# Patient Record
Sex: FEMALE | Race: BLACK OR AFRICAN AMERICAN | NOT HISPANIC OR LATINO | Employment: FULL TIME | URBAN - METROPOLITAN AREA
[De-identification: names, ages, dates, MRNs, and addresses within clinical notes are randomized per-mention and may not be internally consistent; named-entity substitution may affect disease eponyms.]

---

## 2017-01-04 ENCOUNTER — HISTORICAL (OUTPATIENT)
Dept: LAB | Facility: HOSPITAL | Age: 37
End: 2017-01-04

## 2018-07-23 DIAGNOSIS — Z36.89 ENCOUNTER FOR FETAL ANATOMIC SURVEY: ICD-10-CM

## 2018-07-24 DIAGNOSIS — I71.20 THORACIC AORTIC ANEURYSM WITHOUT RUPTURE: Primary | ICD-10-CM

## 2018-07-24 DIAGNOSIS — I71.21 ASCENDING AORTIC ANEURYSM: Primary | ICD-10-CM

## 2018-07-26 ENCOUNTER — ANESTHESIA (OUTPATIENT)
Dept: ANESTHESIOLOGY | Facility: OTHER | Age: 38
End: 2018-07-26

## 2018-07-26 ENCOUNTER — OFFICE VISIT (OUTPATIENT)
Dept: MATERNAL FETAL MEDICINE | Facility: CLINIC | Age: 38
End: 2018-07-26
Payer: MEDICAID

## 2018-07-26 ENCOUNTER — ANESTHESIA EVENT (OUTPATIENT)
Dept: ANESTHESIOLOGY | Facility: OTHER | Age: 38
End: 2018-07-26

## 2018-07-26 ENCOUNTER — HOSPITAL ENCOUNTER (EMERGENCY)
Facility: OTHER | Age: 38
Discharge: HOME | End: 2018-07-26
Attending: OBSTETRICS & GYNECOLOGY
Payer: MEDICAID

## 2018-07-26 VITALS
SYSTOLIC BLOOD PRESSURE: 124 MMHG | SYSTOLIC BLOOD PRESSURE: 116 MMHG | WEIGHT: 141.56 LBS | RESPIRATION RATE: 18 BRPM | DIASTOLIC BLOOD PRESSURE: 70 MMHG | DIASTOLIC BLOOD PRESSURE: 76 MMHG | TEMPERATURE: 98 F | HEART RATE: 90 BPM

## 2018-07-26 DIAGNOSIS — Z3A.35 35 WEEKS GESTATION OF PREGNANCY: Primary | ICD-10-CM

## 2018-07-26 DIAGNOSIS — I71.21 ASCENDING AORTIC ANEURYSM: ICD-10-CM

## 2018-07-26 DIAGNOSIS — Z36.89 ENCOUNTER FOR FETAL ANATOMIC SURVEY: ICD-10-CM

## 2018-07-26 DIAGNOSIS — O47.9 BRAXTON HICK'S CONTRACTION: ICD-10-CM

## 2018-07-26 DIAGNOSIS — I71.20 THORACIC AORTIC ANEURYSM WITHOUT RUPTURE: ICD-10-CM

## 2018-07-26 PROCEDURE — 99212 OFFICE O/P EST SF 10 MIN: CPT | Mod: PBBFAC | Performed by: OBSTETRICS & GYNECOLOGY

## 2018-07-26 PROCEDURE — 76819 FETAL BIOPHYS PROFIL W/O NST: CPT | Mod: 26,S$PBB,, | Performed by: OBSTETRICS & GYNECOLOGY

## 2018-07-26 PROCEDURE — 99284 EMERGENCY DEPT VISIT MOD MDM: CPT | Mod: 25,27

## 2018-07-26 PROCEDURE — 99284 EMERGENCY DEPT VISIT MOD MDM: CPT | Mod: 25,,, | Performed by: OBSTETRICS & GYNECOLOGY

## 2018-07-26 PROCEDURE — 99203 OFFICE O/P NEW LOW 30 MIN: CPT | Mod: 25,S$PBB,, | Performed by: OBSTETRICS & GYNECOLOGY

## 2018-07-26 PROCEDURE — 59025 FETAL NON-STRESS TEST: CPT | Mod: 59

## 2018-07-26 PROCEDURE — 76819 FETAL BIOPHYS PROFIL W/O NST: CPT | Mod: PBBFAC | Performed by: OBSTETRICS & GYNECOLOGY

## 2018-07-26 PROCEDURE — 99999 PR PBB SHADOW E&M-EST. PATIENT-LVL II: CPT | Mod: PBBFAC,,, | Performed by: OBSTETRICS & GYNECOLOGY

## 2018-07-26 PROCEDURE — 76811 OB US DETAILED SNGL FETUS: CPT | Mod: 26,S$PBB,, | Performed by: OBSTETRICS & GYNECOLOGY

## 2018-07-26 PROCEDURE — 76811 OB US DETAILED SNGL FETUS: CPT | Mod: PBBFAC | Performed by: OBSTETRICS & GYNECOLOGY

## 2018-07-26 PROCEDURE — 59025 FETAL NON-STRESS TEST: CPT | Mod: 26,,, | Performed by: OBSTETRICS & GYNECOLOGY

## 2018-07-26 RX ORDER — NAPROXEN SODIUM 220 MG/1
81 TABLET, FILM COATED ORAL DAILY
COMMUNITY

## 2018-07-26 NOTE — PROGRESS NOTES
Bhavna with L&D notified that pt will come up to r/o labor per Dr Saeed. Verbalized understanding of information. Dr Duggan with anesthesia will see pt in OB ED.

## 2018-07-26 NOTE — DISCHARGE INSTRUCTIONS
Report to L&D or call if complaints of decreased fetal movement, loss of fluid, vaginal bleeding, or contractions (every 5 minutes for 2 straight hours).  Increase water intake to 8-10 bottles of water per day.  Continue kick counts.  Keep all existing appointments.       Kick Counts    Its normal to worry about your babys health. One way you can know your babys doing well is to record the babys movements once a day. This is called a kick count. Remember to take your kick count records to all your appointments with your healthcare provider.  How to count kicks  Here are tips for counting kicks:  · Choose a time when the baby is active, such as after a meal.   · Sit comfortably or lie on your side.   · The first time the baby moves, write down the time.   · Count each movement until the baby has moved 10 times. This can take from 20 minutes to 2 hours.   · Try to do it at the same time each day.  When to call your healthcare provider  Call your healthcare provider right away if you notice any of the following:  · Your baby moves fewer than 10 times in 2 hours while youre doing kick counts.  · Your baby moves much less often than on the days before.  · You have not felt your baby move all day.  Date Last Reviewed: 2015  © 4668-3300 [employer]. [address] [place] [address], [place] [address]. All rights reserved. This information is not intended as a substitute for professional medical care. Always follow your healthcare professional's instructions.        Premature Labor    Premature labor ( labor) is when symptoms of labor occur before 37 weeks of pregnancy. (This is 3 weeks before your due date.) Premature labor can lead to premature delivery. This means giving birth to your baby early. Babies need at least 37 weeks of pregnancy for all the organs to develop normally. The earlier the delivery, the greater the risks to the baby.  In most cases, the cause of premature labor is unknown. But  certain factors may make the problem more likely. These include:  · History of premature labor with other pregnancies  · Smoking  · Alcohol or substance abuse  · Low pre-pregnancy weight or weight gain during pregnancy  · Short time period between pregnancies  · Being pregnant with twins, triplets, or more  · History of certain types of surgery on the cervix or uterus  · Having a short cervix  · Certain infections  There are a number of other risk factors. Ask your healthcare provider to help you understand the risk factors specific to your case. Then find out what you can do to control or reduce them.  Contractions are one of the main signs of premature labor. A contraction is different from cramping. It may feel painful and the belly (abdomen) may get hard. It can last from a few seconds to a few minutes. Some women may feel only a sense of pressure in the belly, thighs, rectum, or vagina. Some may feel only the hardening of the uterus without pain or pressure. Or there may be a constant pain the lower back, which spreads forward toward the belly.    Premature labor can often be treated with medicines. A hospital stay will likely be needed. If labor is stopped successfully and you and your baby are both healthy, you may be discharged to continue care at home.  Home care  · Ask your provider any questions you have. Be certain you understand how to care for yourself at home. Also follow all recommendations given by your healthcare providers.  · Learn the signs of premature labor. Watch for these signs when you get home.  · Limit or restrict activities as advised. This may include stopping certain physical activities and cutting back hours at work.  · Avoid doing any strenuous work. Ask family and friends for help with tasks and support at home, if needed.  · Dont smoke, drink alcohol, or use other harmful substances.  · Take steps to reduce stress.  · Report any unusual symptoms to your provider.  Follow-up  care  Follow up with your healthcare provider, or as directed. Weekly visits with your provider may be needed.  When to seek medical advice  Call your healthcare provider right away if any of these occur:  · Regular or frequent contractions, whether they are painful or not  · Pressure in the pelvis  · Pressure in the lower belly or mild cramping in your belly with or without diarrhea  · Constant low, dull backache  · Gush or slow leaking of water from your vagina  · Change in vaginal discharge (watery, mucus, or bloody)  · Any vaginal bleeding  · Decreased movement of your baby  Date Last Reviewed: 9/26/2015  © 6775-3033 [employer]. [address] [place] [address], [place] [address]. All rights reserved. This information is not intended as a substitute for professional medical care. Always follow your healthcare professional's instructions.

## 2018-07-26 NOTE — PROGRESS NOTES
Received to [facility] from [facility] clinic, for ctx. Dr. Dickey notified

## 2018-07-26 NOTE — ED PROVIDER NOTES
Encounter Date: 2018       History     Chief Complaint   Patient presents with    Abdominal Pain     Ailyn Stiles is a 38 y.o. [id number] at 35w5d presents complaining of contractions. Was being seen in [facility] clinic and noted irregular contractions. States she has been experiencing ~2 ctx/hr for the past week.  This IUP is complicated by ascending aortic aneurysm, AMA.  Patient  denies vaginal bleeding, denies LOF.   Fetal Movement: normal.            Review of patient's allergies indicates:  Allergies no known allergies  Past Medical History:   Diagnosis Date    Anxiety     Aortic aneurysm     stent placed 2017    MVP (mitral valve prolapse)      No past surgical history on file.  No family history on file.  Social History   Substance Use Topics    Smoking status: Current Every Day Smoker     Packs/day: 0.50    Smokeless tobacco: Never Used      Comment: 8/10 per day    Alcohol use No     Review of Systems   Constitutional: Negative for chills and fever.   Eyes: Negative for photophobia and visual disturbance.   Respiratory: Negative for cough, choking and shortness of breath.    Cardiovascular: Negative for chest pain and leg swelling.   Gastrointestinal: Negative for diarrhea, nausea and vomiting.   Genitourinary: Negative for vaginal bleeding and vaginal discharge.   Neurological: Negative for dizziness, light-headedness and headaches.       Physical Exam     Initial Vitals   BP Pulse Resp Temp SpO2   -- -- -- -- --      MAP       --       Temp:  [98 °F (36.7 °C)] 98 °F (36.7 °C)  Pulse:  [90] 90  Resp:  [18] 18  BP: (116-124)/(70-76) 124/76    Physical Exam    Constitutional: She appears well-developed.   HENT:   Head: Normocephalic and atraumatic.   Eyes: Conjunctivae and EOM are normal.   Neck: Normal range of motion. Neck supple.   Cardiovascular: Normal rate, regular rhythm, normal heart sounds and intact distal pulses.   Pulmonary/Chest: Breath sounds normal.   Abdominal: Soft.   Gravid, NT    Neurological: She is alert and oriented to person, place, and time.   Skin: Skin is warm and dry.   Psychiatric: She has a normal mood and affect. Her behavior is normal. Judgment and thought content normal.     OB LABOR EXAM:       Method: Sterile vaginal exam per MD.       Dilatation: 1.   Station: -3.   Effacement: 50%.             ED Course   Obtain Fetal nonstress test (NST)  Date/Time: 2018 3:16 PM  Performed by: LISE SHEN  Authorized by: LISE SHEN     Nonstress Test:     Variability:  6-25 BPM    Decelerations:  None    Accelerations:  15 bpm    Baseline:  140    Contractions:  Not present  Biophysical Profile:     Nonstress Test Interpretation: reactive      Overall Impression:  Reassuring        Labs Reviewed - No data to display       Imaging Results    None          Medical Decision Making:   ED Management:  - pt feeling irregular ctx (approx 2/hr)  - no ctx seen on NST  - SVE: /-3  - discharged home with labor precautions              Attending Attestation:   Physician Attestation Statement for Resident:  As the supervising MD   Physician Attestation Statement: I have personally seen and examined this patient.   I agree with the above history. -:   As the supervising MD I agree with the above PE.    As the supervising MD I agree with the above treatment, course, plan, and disposition.   -:   NST  I independently reviewed the fetal non-stress test with the following interpretation:  140 BPM baseline  Variability: moderate  Accelerations: present  Decelerations: absent  Contractions: none  Category 1    Clinical Interpretation:reactive    Patient evaluated and found to be stable, agree with resident's assessment of false  labor and plan to discharge to home with continued close follow up.  I was personally present during the critical portions of the procedure(s) performed by the resident and was immediately available in the ED to provide services and  assistance as needed during the entire procedure.  I have reviewed the following: old records at this facility.                       Clinical Impression:   The primary encounter diagnosis was 35 weeks gestation of pregnancy. Diagnoses of Ascending aortic aneurysm and Omar Hick's contraction were also pertinent to this visit.           Zuri Dickey MD   OBGYN, PGY-1                   Zuri Dickey MD  Resident  07/26/18 1551       Damaris Kincaid MD  07/26/18 1932
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
## 2018-07-30 DIAGNOSIS — O09.90 HIGH RISK PREGNANCY, ANTEPARTUM: Primary | ICD-10-CM

## 2018-08-01 ENCOUNTER — TELEPHONE (OUTPATIENT)
Dept: MATERNAL FETAL MEDICINE | Facility: CLINIC | Age: 38
End: 2018-08-01

## 2018-08-01 DIAGNOSIS — I71.20 THORACIC AORTIC ANEURYSM WITHOUT RUPTURE: ICD-10-CM

## 2018-08-01 DIAGNOSIS — Z01.89 ENCOUNTER FOR LABORATORY TEST: Primary | ICD-10-CM

## 2018-08-01 NOTE — TELEPHONE ENCOUNTER
Message left for patient to call [facility] clinic at [phone] regarding CT scan Dr Saeed would like her to have tomorrow.     4:20 pm- Pt mother Ada notified that we are in the process of scheduling a CT scan tomorrow. Confirmed they will be heading to [place] from their home at 7:30am. Ada informed I will call her back with further instructions about the scan. Verbalized understanding of information.     4:45pm: Message left on pt's voicemail with instructions to go to lab as soon as she arrives at [facility] for required lab work that is scheduled at 9:45am (stat). MFM appointment is at 10:40 am but she can check in as soon as she is done with lab and then she will have CT angio done at imaging center here at [facility] (scheduled at 12:00). Stressed the importance NPO STATUS 4 HOURS prior to scan. Instructed to call to [facility] with any questions or concerns.

## 2018-08-02 ENCOUNTER — HOSPITAL ENCOUNTER (OUTPATIENT)
Dept: CARDIOLOGY | Facility: CLINIC | Age: 38
Discharge: HOME | End: 2018-08-02
Payer: MEDICAID

## 2018-08-02 ENCOUNTER — HOSPITAL ENCOUNTER (OUTPATIENT)
Dept: RADIOLOGY | Facility: OTHER | Age: 38
Discharge: HOME | End: 2018-08-02
Attending: OBSTETRICS & GYNECOLOGY
Payer: MEDICAID

## 2018-08-02 ENCOUNTER — OFFICE VISIT (OUTPATIENT)
Dept: CARDIOLOGY | Facility: CLINIC | Age: 38
End: 2018-08-02
Payer: MEDICAID

## 2018-08-02 ENCOUNTER — OFFICE VISIT (OUTPATIENT)
Dept: MATERNAL FETAL MEDICINE | Facility: CLINIC | Age: 38
End: 2018-08-02
Payer: MEDICAID

## 2018-08-02 VITALS
OXYGEN SATURATION: 99 % | WEIGHT: 141.31 LBS | HEIGHT: 65 IN | HEART RATE: 95 BPM | DIASTOLIC BLOOD PRESSURE: 70 MMHG | BODY MASS INDEX: 23.54 KG/M2 | SYSTOLIC BLOOD PRESSURE: 131 MMHG

## 2018-08-02 VITALS — DIASTOLIC BLOOD PRESSURE: 72 MMHG | WEIGHT: 138 LBS | SYSTOLIC BLOOD PRESSURE: 112 MMHG

## 2018-08-02 DIAGNOSIS — I71.20 THORACIC AORTIC ANEURYSM WITHOUT RUPTURE: ICD-10-CM

## 2018-08-02 DIAGNOSIS — O09.90 HIGH RISK PREGNANCY, ANTEPARTUM: ICD-10-CM

## 2018-08-02 DIAGNOSIS — Z3A.36 36 WEEKS GESTATION OF PREGNANCY: ICD-10-CM

## 2018-08-02 DIAGNOSIS — I71.23 DESCENDING THORACIC AORTIC ANEURYSM: Primary | ICD-10-CM

## 2018-08-02 DIAGNOSIS — I71.019 DISSECTION OF THORACIC AORTA: ICD-10-CM

## 2018-08-02 DIAGNOSIS — I71.23 DESCENDING THORACIC AORTIC ANEURYSM: ICD-10-CM

## 2018-08-02 PROCEDURE — 93320 DOPPLER ECHO COMPLETE: CPT | Mod: 26,S$PBB,, | Performed by: INTERNAL MEDICINE

## 2018-08-02 PROCEDURE — 99213 OFFICE O/P EST LOW 20 MIN: CPT | Mod: PBBFAC,25 | Performed by: PEDIATRICS

## 2018-08-02 PROCEDURE — 99212 OFFICE O/P EST SF 10 MIN: CPT | Mod: PBBFAC,25,27,TH | Performed by: OBSTETRICS & GYNECOLOGY

## 2018-08-02 PROCEDURE — 99212 OFFICE O/P EST SF 10 MIN: CPT | Mod: S$PBB,TH,25, | Performed by: OBSTETRICS & GYNECOLOGY

## 2018-08-02 PROCEDURE — 93303 ECHO TRANSTHORACIC: CPT | Mod: 26,S$PBB,, | Performed by: INTERNAL MEDICINE

## 2018-08-02 PROCEDURE — 93010 ELECTROCARDIOGRAM REPORT: CPT | Mod: S$PBB,,, | Performed by: INTERNAL MEDICINE

## 2018-08-02 PROCEDURE — 99999 PR PBB SHADOW E&M-EST. PATIENT-LVL II: CPT | Mod: PBBFAC,,, | Performed by: OBSTETRICS & GYNECOLOGY

## 2018-08-02 PROCEDURE — 76819 FETAL BIOPHYS PROFIL W/O NST: CPT | Mod: 26,S$PBB,, | Performed by: OBSTETRICS & GYNECOLOGY

## 2018-08-02 PROCEDURE — 71275 CT ANGIOGRAPHY CHEST: CPT | Mod: 26,,, | Performed by: RADIOLOGY

## 2018-08-02 PROCEDURE — 25500020 PHARM REV CODE 255: Performed by: OBSTETRICS & GYNECOLOGY

## 2018-08-02 PROCEDURE — 93325 DOPPLER ECHO COLOR FLOW MAPG: CPT | Mod: PBBFAC | Performed by: INTERNAL MEDICINE

## 2018-08-02 PROCEDURE — 99205 OFFICE O/P NEW HI 60 MIN: CPT | Mod: S$PBB,,, | Performed by: PEDIATRICS

## 2018-08-02 PROCEDURE — 93005 ELECTROCARDIOGRAM TRACING: CPT | Mod: PBBFAC | Performed by: INTERNAL MEDICINE

## 2018-08-02 PROCEDURE — 76819 FETAL BIOPHYS PROFIL W/O NST: CPT | Mod: PBBFAC | Performed by: OBSTETRICS & GYNECOLOGY

## 2018-08-02 PROCEDURE — 99999 PR PBB SHADOW E&M-EST. PATIENT-LVL III: CPT | Mod: PBBFAC,,, | Performed by: PEDIATRICS

## 2018-08-02 PROCEDURE — 71275 CT ANGIOGRAPHY CHEST: CPT | Mod: TC

## 2018-08-02 RX ADMIN — IOHEXOL 75 ML: 350 INJECTION, SOLUTION INTRAVENOUS at 12:08

## 2018-08-02 NOTE — LETTER
August 3, 2018      Khushbu Saeed MD  [address] [address]           [facility]  [address]  [place] [address]  Phone: [phone]          Patient: Ailyn Stiles   MR Number: [id number]   YOB: 1980   Date of Visit: 8/2/2018       Dear Dr. Khushbu Saeed:    Thank you for referring Ailyn Stiles to me for evaluation. Attached you will find relevant portions of my assessment and plan of care.    If you have questions, please do not hesitate to call me. I look forward to following Ailyn Stiles along with you.    Sincerely,    Timi Honeycutt MD    Enclosure  CC:  No Recipients    If you would like to receive this communication electronically, please contact [email or web address] or [phone] to request more information on [employer] access.    For providers and/or their staff who would like to refer a patient to [facility], please contact us through our one-stop-shop provider referral line, [facility], at [phone].    If you feel you have received this communication in error or would no longer like to receive these types of communications, please e-mail [email or web address]

## 2018-08-03 PROBLEM — Z3A.36 36 WEEKS GESTATION OF PREGNANCY: Status: ACTIVE | Noted: 2018-08-03

## 2018-08-03 PROBLEM — I71.23 DESCENDING THORACIC AORTIC ANEURYSM: Status: ACTIVE | Noted: 2018-08-03

## 2018-08-03 PROBLEM — I71.019 DISSECTION OF THORACIC AORTA: Status: ACTIVE | Noted: 2018-08-03
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
## 2018-08-03 NOTE — PROGRESS NOTES
"Indication  ========    BPP.    History  ======    General History  Other: aortic aneurysm 2017 - Stents  anxiety  AMA  MVP  current smoker - 8 to 10 cigarettes per day  Previous Outcomes  Preg. no. 1  Outcome: Live birth  Gest. age 38 w + 0 d  Gender: female  Details: 2002, , 6lbs 7oz  Preg. no. 2  Outcome: Live birth  Gest. age 38 w + 0 d  Gender: male  Details: 2006, , 6lbs 5oz   3  Para 2  Carty children born living (T) 2  Carty children born (T) 2  Carty living children (L) 2    Maternal Assessment  =================    BP syst 112 mmHg  BP diast 72 mmHg    Method  ======    Transabdominal ultrasound examination, [place] E10. View: Good view.    Pregnancy  =========    Carty pregnancy. Number of fetuses: 1.    Dating  ======    Cycle: regular cycle  GA by "stated dating" 36 w + 5 d  ELLE by "stated dating": 2018  Assigned: Dating performed on 2018, based on the external assessment  Assigned GA 36 w + 5 d  Assigned ELLE: 2018    General Evaluation  ==============    Cardiac activity: present.  bpm.  Fetal movements: visualized.  Presentation: cephalic.    Amniotic Fluid Assessment  =====================    Amount of AF: normal amount  MVP 6.7 cm    Biophysical Profile  ==============    2: Fetal breathing movements  2: Gross body movements  2: Fetal tone  2: Amniotic fluid volume  : Biophysical profile score    Consultation  ==========    CT Angio with no endoleak-large repair-2 stents in series.  Met with Dr. Honeycutt . Echo normal. He has recommended she have an evaluation with genetics postpartum. I spoke with him today. Agrees  with plan to deliver at [facility] with assisted second stage (vacuum or forceps) and avoid maternal valsalva.  Discussed case with Dr. Duggan again with anesthesia.  I called patient today to discuss. She understands that if something were to happen, she would require transport to [facility] for CT surgery  services as we do not have those " services immediately available at [facility]. She voiced understanding.  IOL scheduled for 8/6 pm. Anesthesia planning arterial line and epidural before induction starts. Will require aggressive monitoring and  treatment of blood pressure.  I will notify hospitalists who are on service, as well as MFMs who will be on call during her labor induction.    Impression  =========    BPP 8/8.  Normal AFV.    Recommendation  ==============    Induction, 8/6/17 @ 2015.

## 2018-08-06 ENCOUNTER — HOSPITAL ENCOUNTER (INPATIENT)
Facility: OTHER | Age: 38
LOS: 4 days | Discharge: HOME | End: 2018-08-10
Attending: OBSTETRICS & GYNECOLOGY | Admitting: OBSTETRICS & GYNECOLOGY
Payer: MEDICAID

## 2018-08-06 ENCOUNTER — ANESTHESIA (OUTPATIENT)
Dept: OBSTETRICS AND GYNECOLOGY | Facility: OTHER | Age: 38
End: 2018-08-06
Payer: MEDICAID

## 2018-08-06 ENCOUNTER — ANESTHESIA EVENT (OUTPATIENT)
Dept: OBSTETRICS AND GYNECOLOGY | Facility: OTHER | Age: 38
End: 2018-08-06
Payer: MEDICAID

## 2018-08-06 DIAGNOSIS — I71.23 DESCENDING THORACIC AORTIC ANEURYSM: ICD-10-CM

## 2018-08-06 LAB
ABO + RH BLD: NORMAL
AMPHET+METHAMPHET UR QL: NEGATIVE
BARBITURATES UR QL SCN>200 NG/ML: NEGATIVE
BASOPHILS # BLD AUTO: 0.01 K/UL
BASOPHILS NFR BLD: 0.1 %
BENZODIAZ UR QL SCN>200 NG/ML: NEGATIVE
BLD GP AB SCN CELLS X3 SERPL QL: NORMAL
BZE UR QL SCN: NEGATIVE
CANNABINOIDS UR QL SCN: NEGATIVE
CREAT UR-MCNC: 32.6 MG/DL
DIFFERENTIAL METHOD: ABNORMAL
EOSINOPHIL # BLD AUTO: 0.1 K/UL
EOSINOPHIL NFR BLD: 0.7 %
ERYTHROCYTE [DISTWIDTH] IN BLOOD BY AUTOMATED COUNT: 15.2 %
ETHANOL UR-MCNC: <10 MG/DL
HCT VFR BLD AUTO: 27 %
HGB BLD-MCNC: 9.1 G/DL
LYMPHOCYTES # BLD AUTO: 2.5 K/UL
LYMPHOCYTES NFR BLD: 22.3 %
MCH RBC QN AUTO: 26.8 PG
MCHC RBC AUTO-ENTMCNC: 33.7 G/DL
MCV RBC AUTO: 80 FL
METHADONE UR QL SCN>300 NG/ML: NEGATIVE
MONOCYTES # BLD AUTO: 1.3 K/UL
MONOCYTES NFR BLD: 11.1 %
NEUTROPHILS # BLD AUTO: 7.3 K/UL
NEUTROPHILS NFR BLD: 64.7 %
OPIATES UR QL SCN: NEGATIVE
PCP UR QL SCN>25 NG/ML: NEGATIVE
PLATELET # BLD AUTO: 234 K/UL
PMV BLD AUTO: 11 FL
RBC # BLD AUTO: 3.39 M/UL
TOXICOLOGY INFORMATION: NORMAL
WBC # BLD AUTO: 11.28 K/UL

## 2018-08-06 PROCEDURE — 86920 COMPATIBILITY TEST SPIN: CPT

## 2018-08-06 PROCEDURE — 25000003 PHARM REV CODE 250: Performed by: STUDENT IN AN ORGANIZED HEALTH CARE EDUCATION/TRAINING PROGRAM

## 2018-08-06 PROCEDURE — 86592 SYPHILIS TEST NON-TREP QUAL: CPT

## 2018-08-06 PROCEDURE — 36415 COLL VENOUS BLD VENIPUNCTURE: CPT

## 2018-08-06 PROCEDURE — 80307 DRUG TEST PRSMV CHEM ANLYZR: CPT

## 2018-08-06 PROCEDURE — 87340 HEPATITIS B SURFACE AG IA: CPT

## 2018-08-06 PROCEDURE — 27200710 HC EPIDURAL INFUSION PUMP SET: Performed by: ANESTHESIOLOGY

## 2018-08-06 PROCEDURE — 27800517 HC TRAY,EPIDURAL-CONTINUOUS: Performed by: ANESTHESIOLOGY

## 2018-08-06 PROCEDURE — 11000001 HC ACUTE MED/SURG PRIVATE ROOM

## 2018-08-06 PROCEDURE — 86703 HIV-1/HIV-2 1 RESULT ANTBDY: CPT

## 2018-08-06 PROCEDURE — 59409 OBSTETRICAL CARE: CPT | Mod: AA,,, | Performed by: ANESTHESIOLOGY

## 2018-08-06 PROCEDURE — 85025 COMPLETE CBC W/AUTO DIFF WBC: CPT

## 2018-08-06 PROCEDURE — 27800505 HC CATH, RADIAL ARTERY KIT: Performed by: ANESTHESIOLOGY

## 2018-08-06 PROCEDURE — 25000003 PHARM REV CODE 250: Performed by: ANESTHESIOLOGY

## 2018-08-06 PROCEDURE — 63600175 PHARM REV CODE 636 W HCPCS: Performed by: STUDENT IN AN ORGANIZED HEALTH CARE EDUCATION/TRAINING PROGRAM

## 2018-08-06 PROCEDURE — 86850 RBC ANTIBODY SCREEN: CPT

## 2018-08-06 RX ORDER — METHYLERGONOVINE MALEATE 0.2 MG/ML
200 INJECTION INTRAVENOUS
Status: DISCONTINUED | OUTPATIENT
Start: 2018-08-06 | End: 2018-08-07

## 2018-08-06 RX ORDER — ATENOLOL 25 MG/1
25 TABLET ORAL DAILY
Status: DISCONTINUED | OUTPATIENT
Start: 2018-08-07 | End: 2018-08-07

## 2018-08-06 RX ORDER — TERBUTALINE SULFATE 1 MG/ML
0.25 INJECTION SUBCUTANEOUS
Status: DISCONTINUED | OUTPATIENT
Start: 2018-08-06 | End: 2018-08-07

## 2018-08-06 RX ORDER — SODIUM CHLORIDE 9 MG/ML
INJECTION, SOLUTION INTRAVENOUS
Status: DISCONTINUED | OUTPATIENT
Start: 2018-08-06 | End: 2018-08-07

## 2018-08-06 RX ORDER — FENTANYL/BUPIVACAINE/NS/PF 2MCG/ML-.1
PLASTIC BAG, INJECTION (ML) INJECTION
Status: DISPENSED
Start: 2018-08-06 | End: 2018-08-07

## 2018-08-06 RX ORDER — MISOPROSTOL 200 UG/1
800 TABLET ORAL
Status: DISCONTINUED | OUTPATIENT
Start: 2018-08-06 | End: 2018-08-07

## 2018-08-06 RX ORDER — ASPIRIN 81 MG/1
81 TABLET ORAL DAILY
Status: DISCONTINUED | OUTPATIENT
Start: 2018-08-07 | End: 2018-08-10 | Stop reason: HOSPADM

## 2018-08-06 RX ORDER — ONDANSETRON 8 MG/1
8 TABLET, ORALLY DISINTEGRATING ORAL EVERY 8 HOURS PRN
Status: DISCONTINUED | OUTPATIENT
Start: 2018-08-06 | End: 2018-08-07

## 2018-08-06 RX ORDER — CARBOPROST TROMETHAMINE 250 UG/ML
250 INJECTION, SOLUTION INTRAMUSCULAR
Status: DISCONTINUED | OUTPATIENT
Start: 2018-08-06 | End: 2018-08-07

## 2018-08-06 RX ORDER — OXYTOCIN/RINGER'S LACTATE 20/1000 ML
10 PLASTIC BAG, INJECTION (ML) INTRAVENOUS ONCE
Status: DISCONTINUED | OUTPATIENT
Start: 2018-08-06 | End: 2018-08-07

## 2018-08-06 RX ADMIN — DEXTROSE 5 MILLION UNITS: 50 INJECTION, SOLUTION INTRAVENOUS at 09:08

## 2018-08-06 RX ADMIN — Medication 10 ML/HR: at 11:08

## 2018-08-06 RX ADMIN — SODIUM CHLORIDE, SODIUM LACTATE, POTASSIUM CHLORIDE, AND CALCIUM CHLORIDE 1000 ML: .6; .31; .03; .02 INJECTION, SOLUTION INTRAVENOUS at 09:08

## 2018-08-06 RX ADMIN — LIDOCAINE HYDROCHLORIDE,EPINEPHRINE BITARTRATE 2 ML: 15; .005 INJECTION, SOLUTION EPIDURAL; INFILTRATION; INTRACAUDAL; PERINEURAL at 11:08

## 2018-08-06 RX ADMIN — MISOPROSTOL 50 MCG: 100 TABLET ORAL at 11:08

## 2018-08-06 RX ADMIN — SODIUM CHLORIDE, SODIUM LACTATE, POTASSIUM CHLORIDE, AND CALCIUM CHLORIDE 500 ML: .6; .31; .03; .02 INJECTION, SOLUTION INTRAVENOUS at 11:08

## 2018-08-07 LAB
HBV SURFACE AG SERPL QL IA: NEGATIVE
HIV 1+2 AB+HIV1 P24 AG SERPL QL IA: NEGATIVE
RPR SER QL: NORMAL

## 2018-08-07 PROCEDURE — 25000003 PHARM REV CODE 250: Performed by: OBSTETRICS & GYNECOLOGY

## 2018-08-07 PROCEDURE — 86920 COMPATIBILITY TEST SPIN: CPT

## 2018-08-07 PROCEDURE — 58300 INSERT INTRAUTERINE DEVICE: CPT | Mod: 59,,, | Performed by: OBSTETRICS & GYNECOLOGY

## 2018-08-07 PROCEDURE — 0UH97HZ INSERTION OF CONTRACEPTIVE DEVICE INTO UTERUS, VIA NATURAL OR ARTIFICIAL OPENING: ICD-10-PCS | Performed by: OBSTETRICS & GYNECOLOGY

## 2018-08-07 PROCEDURE — 62326 NJX INTERLAMINAR LMBR/SAC: CPT | Performed by: ANESTHESIOLOGY

## 2018-08-07 PROCEDURE — 25000003 PHARM REV CODE 250: Performed by: STUDENT IN AN ORGANIZED HEALTH CARE EDUCATION/TRAINING PROGRAM

## 2018-08-07 PROCEDURE — 63600175 PHARM REV CODE 636 W HCPCS: Performed by: STUDENT IN AN ORGANIZED HEALTH CARE EDUCATION/TRAINING PROGRAM

## 2018-08-07 PROCEDURE — 51702 INSERT TEMP BLADDER CATH: CPT

## 2018-08-07 PROCEDURE — 63600175 PHARM REV CODE 636 W HCPCS: Performed by: OBSTETRICS & GYNECOLOGY

## 2018-08-07 PROCEDURE — 72100003 HC LABOR CARE, EA. ADDL. 8 HRS

## 2018-08-07 PROCEDURE — 25000003 PHARM REV CODE 250: Performed by: ANESTHESIOLOGY

## 2018-08-07 PROCEDURE — 72200006 HC VAGINAL DELIVERY LEVEL III

## 2018-08-07 PROCEDURE — 63600175 PHARM REV CODE 636 W HCPCS

## 2018-08-07 PROCEDURE — 72100002 HC LABOR CARE, 1ST 8 HOURS

## 2018-08-07 PROCEDURE — 36620 INSERTION CATHETER ARTERY: CPT | Performed by: ANESTHESIOLOGY

## 2018-08-07 PROCEDURE — 59409 OBSTETRICAL CARE: CPT | Mod: AT,,, | Performed by: OBSTETRICS & GYNECOLOGY

## 2018-08-07 PROCEDURE — 0HQ9XZZ REPAIR PERINEUM SKIN, EXTERNAL APPROACH: ICD-10-PCS | Performed by: OBSTETRICS & GYNECOLOGY

## 2018-08-07 PROCEDURE — 63600175 PHARM REV CODE 636 W HCPCS: Performed by: ANESTHESIOLOGY

## 2018-08-07 PROCEDURE — 10907ZC DRAINAGE OF AMNIOTIC FLUID, THERAPEUTIC FROM PRODUCTS OF CONCEPTION, VIA NATURAL OR ARTIFICIAL OPENING: ICD-10-PCS | Performed by: OBSTETRICS & GYNECOLOGY

## 2018-08-07 PROCEDURE — S0020 INJECTION, BUPIVICAINE HYDRO: HCPCS | Performed by: STUDENT IN AN ORGANIZED HEALTH CARE EDUCATION/TRAINING PROGRAM

## 2018-08-07 PROCEDURE — 20000000 HC ICU ROOM

## 2018-08-07 RX ORDER — SODIUM CHLORIDE, SODIUM LACTATE, POTASSIUM CHLORIDE, CALCIUM CHLORIDE 600; 310; 30; 20 MG/100ML; MG/100ML; MG/100ML; MG/100ML
INJECTION, SOLUTION INTRAVENOUS CONTINUOUS
Status: DISCONTINUED | OUTPATIENT
Start: 2018-08-07 | End: 2018-08-07

## 2018-08-07 RX ORDER — ONDANSETRON 8 MG/1
8 TABLET, ORALLY DISINTEGRATING ORAL EVERY 8 HOURS PRN
Status: DISCONTINUED | OUTPATIENT
Start: 2018-08-07 | End: 2018-08-10 | Stop reason: HOSPADM

## 2018-08-07 RX ORDER — FENTANYL CITRATE 50 UG/ML
INJECTION, SOLUTION INTRAMUSCULAR; INTRAVENOUS
Status: DISCONTINUED | OUTPATIENT
Start: 2018-08-07 | End: 2018-08-07

## 2018-08-07 RX ORDER — OXYTOCIN/RINGER'S LACTATE 20/1000 ML
41.65 PLASTIC BAG, INJECTION (ML) INTRAVENOUS CONTINUOUS
Status: DISCONTINUED | OUTPATIENT
Start: 2018-08-07 | End: 2018-08-07

## 2018-08-07 RX ORDER — IBUPROFEN 600 MG/1
600 TABLET ORAL EVERY 6 HOURS
Status: DISCONTINUED | OUTPATIENT
Start: 2018-08-07 | End: 2018-08-10 | Stop reason: HOSPADM

## 2018-08-07 RX ORDER — HYDROCORTISONE 25 MG/G
CREAM TOPICAL 3 TIMES DAILY PRN
Status: DISCONTINUED | OUTPATIENT
Start: 2018-08-07 | End: 2018-08-10 | Stop reason: HOSPADM

## 2018-08-07 RX ORDER — DIPHENHYDRAMINE HCL 25 MG
25 CAPSULE ORAL EVERY 4 HOURS PRN
Status: DISCONTINUED | OUTPATIENT
Start: 2018-08-07 | End: 2018-08-10 | Stop reason: HOSPADM

## 2018-08-07 RX ORDER — METOCLOPRAMIDE HYDROCHLORIDE 5 MG/ML
5 INJECTION INTRAMUSCULAR; INTRAVENOUS EVERY 6 HOURS PRN
Status: DISCONTINUED | OUTPATIENT
Start: 2018-08-07 | End: 2018-08-10 | Stop reason: HOSPADM

## 2018-08-07 RX ORDER — FENTANYL CITRATE 50 UG/ML
INJECTION, SOLUTION INTRAMUSCULAR; INTRAVENOUS
Status: COMPLETED
Start: 2018-08-07 | End: 2018-08-07

## 2018-08-07 RX ORDER — DOCUSATE SODIUM 100 MG/1
200 CAPSULE, LIQUID FILLED ORAL 2 TIMES DAILY PRN
Status: DISCONTINUED | OUTPATIENT
Start: 2018-08-07 | End: 2018-08-10 | Stop reason: HOSPADM

## 2018-08-07 RX ORDER — DIPHENHYDRAMINE HYDROCHLORIDE 50 MG/ML
12.5 INJECTION INTRAMUSCULAR; INTRAVENOUS EVERY 4 HOURS PRN
Status: DISCONTINUED | OUTPATIENT
Start: 2018-08-07 | End: 2018-08-10 | Stop reason: HOSPADM

## 2018-08-07 RX ORDER — OXYTOCIN/RINGER'S LACTATE 20/1000 ML
25 PLASTIC BAG, INJECTION (ML) INTRAVENOUS CONTINUOUS
Status: DISPENSED | OUTPATIENT
Start: 2018-08-07 | End: 2018-08-07

## 2018-08-07 RX ORDER — BUPIVACAINE HYDROCHLORIDE 2.5 MG/ML
INJECTION, SOLUTION INFILTRATION; PERINEURAL CONTINUOUS PRN
Status: DISCONTINUED | OUTPATIENT
Start: 2018-08-07 | End: 2018-08-07

## 2018-08-07 RX ORDER — LIDOCAINE HYDROCHLORIDE AND EPINEPHRINE 15; 5 MG/ML; UG/ML
INJECTION, SOLUTION EPIDURAL
Status: DISCONTINUED | OUTPATIENT
Start: 2018-08-06 | End: 2018-08-07

## 2018-08-07 RX ORDER — OXYTOCIN/RINGER'S LACTATE 20/1000 ML
2 PLASTIC BAG, INJECTION (ML) INTRAVENOUS CONTINUOUS
Status: DISCONTINUED | OUTPATIENT
Start: 2018-08-07 | End: 2018-08-07

## 2018-08-07 RX ORDER — HYDROMORPHONE HYDROCHLORIDE 1 MG/ML
1 INJECTION, SOLUTION INTRAMUSCULAR; INTRAVENOUS; SUBCUTANEOUS ONCE
Status: COMPLETED | OUTPATIENT
Start: 2018-08-07 | End: 2018-08-07

## 2018-08-07 RX ORDER — AMOXICILLIN 250 MG
1 CAPSULE ORAL NIGHTLY
Status: DISCONTINUED | OUTPATIENT
Start: 2018-08-07 | End: 2018-08-10 | Stop reason: HOSPADM

## 2018-08-07 RX ORDER — HYDROCODONE BITARTRATE AND ACETAMINOPHEN 10; 325 MG/1; MG/1
1 TABLET ORAL EVERY 4 HOURS PRN
Status: DISCONTINUED | OUTPATIENT
Start: 2018-08-07 | End: 2018-08-10 | Stop reason: HOSPADM

## 2018-08-07 RX ORDER — BUPIVACAINE HYDROCHLORIDE 2.5 MG/ML
INJECTION, SOLUTION EPIDURAL; INFILTRATION; INTRACAUDAL
Status: DISPENSED
Start: 2018-08-07 | End: 2018-08-07

## 2018-08-07 RX ORDER — FENTANYL/BUPIVACAINE/NS/PF 2MCG/ML-.1
10 PLASTIC BAG, INJECTION (ML) INJECTION CONTINUOUS
Status: DISCONTINUED | OUTPATIENT
Start: 2018-08-07 | End: 2018-08-10 | Stop reason: HOSPADM

## 2018-08-07 RX ORDER — HYDROCODONE BITARTRATE AND ACETAMINOPHEN 5; 325 MG/1; MG/1
1 TABLET ORAL EVERY 4 HOURS PRN
Status: DISCONTINUED | OUTPATIENT
Start: 2018-08-07 | End: 2018-08-10 | Stop reason: HOSPADM

## 2018-08-07 RX ORDER — ONDANSETRON 2 MG/ML
4 INJECTION INTRAMUSCULAR; INTRAVENOUS ONCE AS NEEDED
Status: ACTIVE | OUTPATIENT
Start: 2018-08-07 | End: 2018-08-07

## 2018-08-07 RX ORDER — FENTANYL/BUPIVACAINE/NS/PF 2MCG/ML-.1
PLASTIC BAG, INJECTION (ML) INJECTION CONTINUOUS PRN
Status: DISCONTINUED | OUTPATIENT
Start: 2018-08-06 | End: 2018-08-07

## 2018-08-07 RX ADMIN — Medication 25 MILLI-UNITS/MIN: at 11:08

## 2018-08-07 RX ADMIN — Medication 2 MILLI-UNITS/MIN: at 04:08

## 2018-08-07 RX ADMIN — DEXTROSE 2.5 MILLION UNITS: 50 INJECTION, SOLUTION INTRAVENOUS at 01:08

## 2018-08-07 RX ADMIN — ATENOLOL 12.5 MG: 25 TABLET ORAL at 07:08

## 2018-08-07 RX ADMIN — HYDROCODONE BITARTRATE AND ACETAMINOPHEN 1 TABLET: 5; 325 TABLET ORAL at 02:08

## 2018-08-07 RX ADMIN — BUPIVACAINE HYDROCHLORIDE 4 ML/HR: 2.5 INJECTION, SOLUTION INFILTRATION; PERINEURAL at 10:08

## 2018-08-07 RX ADMIN — HYDROMORPHONE HYDROCHLORIDE 1 MG: 1 INJECTION, SOLUTION INTRAMUSCULAR; INTRAVENOUS; SUBCUTANEOUS at 07:08

## 2018-08-07 RX ADMIN — DEXTROSE 2.5 MILLION UNITS: 50 INJECTION, SOLUTION INTRAVENOUS at 10:08

## 2018-08-07 RX ADMIN — IBUPROFEN 600 MG: 600 TABLET ORAL at 05:08

## 2018-08-07 RX ADMIN — DIPHENHYDRAMINE HYDROCHLORIDE 12.5 MG: 50 INJECTION, SOLUTION INTRAMUSCULAR; INTRAVENOUS at 02:08

## 2018-08-07 RX ADMIN — DEXTROSE 2.5 MILLION UNITS: 50 INJECTION, SOLUTION INTRAVENOUS at 06:08

## 2018-08-07 RX ADMIN — LEVONORGESTREL 1 INTRA UTERINE DEVICE: 52 INTRAUTERINE DEVICE INTRAUTERINE at 11:08

## 2018-08-07 RX ADMIN — STANDARDIZED SENNA CONCENTRATE AND DOCUSATE SODIUM 1 TABLET: 8.6; 5 TABLET, FILM COATED ORAL at 09:08

## 2018-08-07 RX ADMIN — SODIUM CHLORIDE, SODIUM LACTATE, POTASSIUM CHLORIDE, AND CALCIUM CHLORIDE: .6; .31; .03; .02 INJECTION, SOLUTION INTRAVENOUS at 07:08

## 2018-08-07 RX ADMIN — IBUPROFEN 600 MG: 600 TABLET ORAL at 11:08

## 2018-08-07 RX ADMIN — Medication 41.65 MILLI-UNITS/MIN: at 12:08

## 2018-08-07 RX ADMIN — IBUPROFEN 600 MG: 600 TABLET ORAL at 01:08

## 2018-08-07 RX ADMIN — FENTANYL CITRATE 100 MCG: 50 INJECTION, SOLUTION INTRAMUSCULAR; INTRAVENOUS at 10:08

## 2018-08-07 RX ADMIN — HYDROCODONE BITARTRATE AND ACETAMINOPHEN 1 TABLET: 10; 325 TABLET ORAL at 03:08

## 2018-08-07 NOTE — ANESTHESIA PROCEDURE NOTES
Epidural    Patient location during procedure: OB   Reason for block: primary anesthetic   Diagnosis: IUP   Start time: 8/6/2018 11:24 PM  Timeout: 8/6/2018 11:24 PM  End time: 8/6/2018 11:30 PM  Staffing  Anesthesiologist: CAROL SPAULDING  Resident/CRNA: MOON ALARCON  Performed: resident/CRNA   Preanesthetic Checklist  Completed: patient identified, surgical consent, pre-op evaluation, timeout performed, IV checked, risks and benefits discussed, monitors and equipment checked, anesthesia consent given, hand hygiene performed and patient being monitored  Preparation  Patient position: sitting  Prep: ChloraPrep  Patient monitoring: Pulse Ox, Blood Pressure and ECG  Epidural  Skin Anesthetic: lidocaine 1%  Skin Wheal: 3 mL  Administration type: continuous  Approach: midline  Interspace: L3-4  Injection technique: ANTONELLA saline  Needle and Epidural Catheter  Needle type: Tuohy   Needle gauge: 17  Needle length: 3.5 inches  Needle insertion depth: 4 cm  Catheter type: springwound and multi-orifice  Catheter size: 19 G  Catheter at skin depth: 8 cm  Test dose: 2 mL of lidocaine 1.5% with Epi 1-to-200,000  Additional Documentation: incremental injection, negative aspiration for heme and CSF, no paresthesia on injection, no signs/symptoms of IV or SA injection, no significant pain on injection and no significant complaints from patient  Needle localization: anatomical landmarks  Medications:  Bolus administered: 10 mL of 0.1% bupivacaine  Epinephrine added: none  Volume per aspiration: 3 mL  Time between aspirations: 5 minutes  Assessment  Lower dermatomal level: N/A  Ease of block: easy  Patient's tolerance of the procedure: comfortable throughout block and no complaints  Post dural Puncture Headache?: No  Additional Notes  Dural puncture performed to confirm midline; csf obtained

## 2018-08-07 NOTE — PROGRESS NOTES
"LABOR NOTE    S:  Complaints: No.  Epidural working:  yes  MD to bedside patient is feeling pressures    O: /67   Pulse 68   Temp 97.7 °F (36.5 °C)   Resp 18   Ht 5' 5" (1.651 m)   Wt 63.5 kg (140 lb)   SpO2 100%   Breastfeeding? No   BMI 23.30 kg/m²       FHT: 130 Cat 1 (reassuring)  CTX: q 1-3 minutes  SVE: c/+1      ASSESSMENT:   38 y.o.  at 37w3d,     FHT reassuring    Active Hospital Problems    Diagnosis  POA    *Indication for care in labor or delivery [O75.9]  Yes    Descending thoracic aortic aneurysm [I71.2]  Yes      Resolved Hospital Problems    Diagnosis Date Resolved POA   No resolved problems to display.         PLAN:    Continue Close Maternal/Fetal Monitoring  Pitocin Augmentation per protocol  Set up room to deliver, will apply forceps at outlet station to avoid necessity for maternal efforts      Johann Hilliard MD   PGY-3 Ob-gyn      "

## 2018-08-07 NOTE — PROGRESS NOTES
Spoke with anesthesia regarding use of arterial line. Will use automatic bp cuff monitoring and assess accuracy/corelate every 30 minutes. Arterial line will not document to [facility] at this time.

## 2018-08-07 NOTE — PROCEDURES
"Ailyn Stiles is a 38 y.o. female patient.    Temp: 97.7 °F (36.5 °C) (08/07/18 0720)  Pulse: 84 (08/07/18 1140)  Resp: 18 (08/07/18 0720)  BP: 128/74 (08/07/18 1140)  SpO2: 100 % (08/07/18 1140)  Weight: 63.5 kg (140 lb) (08/06/18 2045)  Height: 5' 5" (165.1 cm) (08/06/18 2045)       Procedures    KIP Hilliard  8/7/2018 08/07/2018      TIME:  1130a    PROCEDURE:  Mirena insertion    INDICATION: Contraception    PATIENT CONSENT: She was counseled on the risks, benefits, indications, and alternatives to IUD use.  She understands that with insertion there is a risk of bleeding, infection, and uterine perforation.  All questions are answered.  Consents signed. She understands the increased risk of expulsion in the post partum period.    PROCEDURE NOTE:    Time out performed. The mirena device was removed from its applicator and grasped with a ring forceps at its arms. String was trimmed to 12cm It was placed high in the uterine fundus without difficulty using sterile technique.    COMPLICATIONS: None      Assessment:  1.  Contraceptive management/IUD insertion    Post IUD placement counseling:  Manage post IUD placement pain with NSAIDS, Tylenol or Rx per Medcard.  IUD danger signs and how to check for strings were discussed.  The IUD needs to be removed in 5 years for Mirena and 10 years for Copper IUD.    Follow up:  6 weeks for string check at post partum visit      KIP Hilliard MD 08/07/2018 11:50 AM        "

## 2018-08-07 NOTE — L&D DELIVERY NOTE
[facility]  Vaginal Delivery   Operative Note    SUMMARY     Forceps assisted vaginal delivery of live infant, was placed on mothers abdomen for skin to skin and bulb suctioning performed.  Infant delivered position OA over intact perineum.  Nuchal cord: Yes, cord reduced following delivery.    Manual delivery of placenta and IV pitocin given noting good uterine tone. An IUD was placed without complication, please see procedure note.  superficial bilateral labial lacerations repaired with 3-0 vicryl and noted to be hemostatic.    Patient tolerated delivery well. Sponge needle and lap counted correctly x2.      Indications: Forceps delivery with baby delivered  Pregnancy complicated by:   Patient Active Problem List   Diagnosis    Descending thoracic aortic aneurysm    36 weeks gestation of pregnancy    Dissection of thoracic aorta    Indication for care in labor or delivery    Forceps delivery with baby delivered     Admitting GA: 37w3d    Delivery Information for  Sandhya Stiles    Birth information:  YOB: 2017   Time of birth: 11:14 AM   Sex: female   Head Delivery Date/Time: 2018 11:14 AM   Delivery type: Vaginal, Forceps   Gestational Age: 37w3d    Delivery Providers    Delivering clinician:  Damaris Kincaid MD   Provider Role    SY Edwards RN     Camryn Hilliard MD                 [place] Assessment    Living status:  Living  Apgars:     1 Minute:   5 Minute:   10 Minute:   15 Minute:   20 Minute:     Skin Color:   1  1       Heart Rate:   2  2       Reflex Irritability:   2  2       Muscle Tone:   2  2       Respiratory Effort:   2  2       Total:   9  9               Apgars Assigned By:  KATHRYN         Assisted Delivery Details:    Forceps attempted?:  Yes  Forceps type:  Bruce-Luikart  Forceps application location:  Outlet  Number of pulls with forceps:  1  Failed forceps delivery?:  No  Vacuum extractor  attempted?:  No         Shoulder Dystocia    Shoulder dystocia present?:  No           Presentation and Position    Presentation:  Vertex  Position:  Middle Occiput           Interventions/Resuscitation    Method:  Bulb Suctioning       Cord    Vessels:  3 vessels  Complications:  None  Delayed Cord Clamping?:  No  Cord Blood Disposition:  Sent with Baby  Gases Sent?:  No  Stem Cell Collection (by MD):  No       Placenta    Date and time:  2018 11:20 AM  Removal:  Spontaneous  Appearance:  Intact  Placenta disposition:  discarded           Labor Events:       labor: No     Labor Onset Date/Time:         Dilation Complete Date/Time:         Start Pushing Date/Time:       Rupture Date/Time:              Rupture type:           Fluid Amount:        Fluid Color:        Fluid Odor:        Membrane Status (PeriCalm): ARM (Artificial Rupture)      Rupture Date/Time (PeriCalm): 2018 05:15:00      Fluid Amount (PeriCalm): Large      Fluid Color (PeriCalm): Clear       steroids: None     Antibiotics given for GBS: Yes     Induction: oxytocin     Indications for induction:        Augmentation: amniotomy     Indications for augmentation:       Labor complications:       Additional complications:          Cervical ripening:                     Delivery:      Episiotomy: None     Indication for Episiotomy:       Perineal Lacerations:   Repaired:      Periurethral Laceration: none Repaired:     Labial Laceration: bilateral Repaired:     Sulcus Laceration: none Repaired:     Vaginal Laceration: No Repaired:     Cervical Laceration: No Repaired:     Repair suture:       Repair # of packets:       Vaginal delivery QBL (mL):        QBL (mL): 0     Combined Blood Loss (mL): 0     Vaginal Sweep Performed: Yes     Surgicount Correct: Yes       Other providers:       Anesthesia    Method:  Epidural          Details (if applicable):  Trial of Labor      Categorization:       Priority:     Indications for :     Incision Type:       Additional  information:  Forceps:    Vacuum:    Breech:    Observed anomalies    Other (Comments):             Johann Hilliard MD   PGY-4 Ob-gyn

## 2018-08-07 NOTE — NURSING
Called L&D and spoke with SY Price, to request a visit from baby Gina. Dee will  call Mother Baby unit.

## 2018-08-07 NOTE — PROGRESS NOTES
"LABOR NOTE  MD to bedside for cervical check  S:  Complaints: No.  Epidural working:  yes    O: /69   Pulse 68   Temp 97.8 °F (36.6 °C)   Resp 18   Ht 5' 5" (1.651 m)   Wt 63.5 kg (140 lb)   SpO2 100%   Breastfeeding? No   BMI 23.30 kg/m²       FHT: baseline 140, mod btbv, + accels, - decels. Cat 1 (reassuring)  CTX: 4  SVE: /-2      ASSESSMENT:   38 y.o.  at 37w3d, presents for scheduled IOL with maternal AAA, mitral valve prolapse, sickle cell trait, and GBS+ status.    FHT reassuring    Active Hospital Problems    Diagnosis  POA    *Indication for care in labor or delivery [O75.9]  Yes    Descending thoracic aortic aneurysm [I71.2]  Yes      Resolved Hospital Problems    Diagnosis Date Resolved POA   No resolved problems to display.         PLAN:    Continue Close Maternal/Fetal Monitoring  Continue pitocin augmentation  Plan is for operative vaginal delivery with laboring down until +2 station if fetal status reassuring  Recheck 2 hours or PRN    Zain Waller MD  PGY-3 OB/GYN  [phone]    "

## 2018-08-07 NOTE — ANESTHESIA PROCEDURE NOTES
Arterial    Diagnosis: thoracic aortic aneurysm    Patient location during procedure: OB  Procedure start time: 8/6/2018 11:20 PM  Timeout: 8/6/2018 11:20 PM  Procedure end time: 8/6/2018 11:24 PM  Staffing  Anesthesiologist: CAROL SPAULDING  Resident/CRNA: MOON ALARCON  Performed: resident/CRNA   Anesthesiologist was present at the time of the procedure.  Preanesthetic Checklist  Completed: patient identified, surgical consent, pre-op evaluation, timeout performed, IV checked, risks and benefits discussed, monitors and equipment checked and anesthesia consent givenArterial  Skin Prep: chlorhexidine gluconate  Local Infiltration: lidocaine  Orientation: right  Location: radial  Catheter Size: 20 G  Catheter placement by Anatomical landmarks. Heme positive aspiration all ports.Insertion Attempts: 1  Assessment  Dressing: secured with tape and tegaderm  Patient: Tolerated well

## 2018-08-07 NOTE — PLAN OF CARE
Problem: Labor (Cervical Ripen, Induct, Augment) (Adult,Obstetrics,Pediatric)  Goal: Signs and Symptoms of Listed Potential Problems Will be Absent, Minimized or Managed (Labor)  Signs and symptoms of listed potential problems will be absent, minimized or managed by discharge/transition of care (reference Labor (Cervical Ripen, Induct, Augment) (Adult,Obstetrics,Pediatric) CPG).   Outcome: Ongoing (interventions implemented as appropriate)    Pt resting without pain. Labor progressing at this time. Vitals stable.

## 2018-08-07 NOTE — ANESTHESIA PREPROCEDURE EVALUATION
Ailyn Stiles is a 38 y.o. female  at 37w0d with PHx significant for current smoker and thoracic aortic aneurysm with associated Type B dissection in 2017 s/p endovascular repair with 2 stents in series who is admitted for IOL. Per cardiology recommendation, patient can delivery vaginally however to avoid any pushing (vaccum/forceps) and closer BP monitoring for avoidance of HTN. Patient also reports history of mitral valve prolapse as a child but MV looks normal on echo.    Patient had previous epidural without issue.     OB History    Para Term  AB Living   3 2 2     2   SAB TAB Ectopic Multiple Live Births           2      # Outcome Date GA Lbr Larry/2nd Weight Sex Delivery Anes PTL Lv   3 Current            2 Term 06 38w0d  2.863 kg (6 lb 5 oz) M Vag-Spont   ANTONI   1 Term 02 38w0d  2.892 kg (6 lb 6 oz) F Vag-Spont   ANTONI          Wt Readings from Last 1 Encounters:   18 2045 63.5 kg (140 lb)       BP Readings from Last 3 Encounters:   18 124/70   18 131/70   18 112/72       Patient Active Problem List   Diagnosis    Descending thoracic aortic aneurysm    36 weeks gestation of pregnancy    Dissection of thoracic aorta    Indication for care in labor or delivery       Past Surgical History:   Procedure Laterality Date    THORACIC AORTA STENT  2017    aortic dissection/aneurysm       Social History     Social History    Marital status: Single     Spouse name: N/A    Number of children: N/A    Years of education: N/A     Occupational History    Not on file.     Social History Main Topics    Smoking status: Former Smoker     Packs/day: 0.50     Quit date: 2018    Smokeless tobacco: Never Used      Comment: 8/10 per day    Alcohol use No    Drug use: No    Sexual activity: Yes     Partners: Male     Other Topics Concern    Not on file     Social History Narrative    No narrative on file         Chemistry        Component Value Date/Time     BUN 5 (L) 08/02/2018 0941    CREATININE 0.7 08/02/2018 0941        Component Value Date/Time    ESTGFRAFRICA >60 08/02/2018 0941    EGFRNONAA >60 08/02/2018 0941            Lab Results   Component Value Date    WBC 11.28 08/06/2018    HGB 9.1 (L) 08/06/2018    HCT 27.0 (L) 08/06/2018    MCV 80 (L) 08/06/2018     08/06/2018       No results for input(s): PT, INR, PROTIME, APTT in the last 72 hours.      CTA chest 8/2/2018  Prior thoracic aortic stent graft placement.  No evidence of endoleak.  Maximum transverse dimension of native aneurysm sac 5.7 x 4.2 cm.    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  This could be confirmed with MRI abdomen with Eovist IV contrast.    2D echo 8/2/2018  CONCLUSIONS   Small Hyperdynamic LV cavity    1 - Normal left ventricular systolic function (EF 65-70%).     2 - Normal left ventricular diastolic function.     3 - Normal right ventricular systolic function .     4 - The estimated PA systolic pressure is 17 mmHg.     5 - No wall motion abnormalities.     EKG 8/2/2018  Vent. Rate : 095 BPM     Atrial Rate : 095 BPM     P-R Int : 134 ms          QRS Dur : 080 ms      QT Int : 330 ms       P-R-T Axes : 075 031 052 degrees     QTc Int : 414 ms    Sinus rhythm with Premature atrial complexes  Otherwise normal ECG  No previous ECGs available  Confirmed by ALTHEA SORIANO MD ([id number]) on 8/2/2018 2:28:44 PM    Pre-op Assessment    I have reviewed the Patient Summary Reports.     I have reviewed the Nursing Notes.   I have reviewed the Medications.     Review of Systems  Anesthesia Hx:  No problems with previous Anesthesia  History of prior surgery of interest to airway management or planning: Previous anesthesia: General, Epidural Denies Family Hx of Anesthesia complications.   Denies Personal Hx of Anesthesia complications.   Social:  Smoker    Cardiovascular:   Previous thoracic aortic aneurysm with Type B dissection s/p endovascular repair    Pulmonary:  Pulmonary Normal    Hepatic/GI:  Hepatic/GI Normal    Neurological:  Neurology Normal    Endocrine:  Endocrine Normal        Physical Exam  General:  Well nourished    Airway/Jaw/Neck:  Airway Findings: Mouth Opening: Normal General Airway Assessment: Adult  Improves to II with phonation.  TM Distance: 4 - 6 cm      Dental:  Dental Findings: Upper Dentures   Chest/Lungs:  Chest/Lungs Findings: Clear to auscultation, Normal Respiratory Rate     Heart/Vascular:  Heart Findings: Rate: Normal  Rhythm: Regular Rhythm  Heart murmur: negative       Mental Status:  Mental Status Findings:  Cooperative, Alert and Oriented         Anesthesia Plan  Type of Anesthesia, risks & benefits discussed:  Anesthesia Type:  general, epidural  Patient's Preference:   Intra-op Monitoring Plan: standard ASA monitors and arterial line  Intra-op Monitoring Plan Comments:   Post Op Pain Control Plan: IV/PO Opioids PRN, multimodal analgesia, per primary service following discharge from PACU and epidural analgesia  Post Op Pain Control Plan Comments:   Induction:   IV  Beta Blocker:  Patient is on a Beta-Blocker and has received one dose within the past 24 hours (No further documentation required).       Informed Consent: Patient understands risks and agrees with Anesthesia plan.  Questions answered. Anesthesia consent signed with patient.  ASA Score: 3     Day of Surgery Review of History & Physical:    H&P update referred to the surgeon.         Ready For Surgery From Anesthesia Perspective.
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
## 2018-08-07 NOTE — ANESTHESIA PREPROCEDURE EVALUATION
Ailyn Stiles is a 38 y.o. female  at 37w0d with PHx significant for current smoker and thoracic aortic aneurysm with associated Type B dissection in 2017 s/p endovascular repair with 2 stents in series who is admitted for IOL. Per cardiology recommendation, patient can delivery vaginally however to avoid any pushing (vaccum/forceps) and closer BP monitoring for avoidance of HTN. Patient also reports history of mitral valve prolapse as a child but MV looks normal on echo.    Patient had previous epidural without issue.     OB History    Para Term  AB Living   3 2 2     2   SAB TAB Ectopic Multiple Live Births           2      # Outcome Date GA Lbr Larry/2nd Weight Sex Delivery Anes PTL Lv   3 Current            2 Term 06 38w0d  2.863 kg (6 lb 5 oz) M Vag-Spont   ANTONI   1 Term 02 38w0d  2.892 kg (6 lb 6 oz) F Vag-Spont   ANTONI          Wt Readings from Last 1 Encounters:   18 2045 63.5 kg (140 lb)       BP Readings from Last 3 Encounters:   18 124/70   18 131/70   18 112/72       Patient Active Problem List   Diagnosis    Descending thoracic aortic aneurysm    36 weeks gestation of pregnancy    Dissection of thoracic aorta    Indication for care in labor or delivery       Past Surgical History:   Procedure Laterality Date    THORACIC AORTA STENT  2017    aortic dissection/aneurysm       Social History     Social History    Marital status: Single     Spouse name: N/A    Number of children: N/A    Years of education: N/A     Occupational History    Not on file.     Social History Main Topics    Smoking status: Former Smoker     Packs/day: 0.50     Quit date: 2018    Smokeless tobacco: Never Used      Comment: 8/10 per day    Alcohol use No    Drug use: No    Sexual activity: Yes     Partners: Male     Other Topics Concern    Not on file     Social History Narrative    No narrative on file         Chemistry        Component Value Date/Time     BUN 5 (L) 08/02/2018 0941    CREATININE 0.7 08/02/2018 0941        Component Value Date/Time    ESTGFRAFRICA >60 08/02/2018 0941    EGFRNONAA >60 08/02/2018 0941            No results found for: WBC, HGB, HCT, MCV, PLT    No results for input(s): PT, INR, PROTIME, APTT in the last 72 hours.      CTA chest 8/2/2018  Prior thoracic aortic stent graft placement.  No evidence of endoleak.  Maximum transverse dimension of native aneurysm sac 5.7 x 4.2 cm.    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  This could be confirmed with MRI abdomen with Eovist IV contrast.    2D echo 8/2/2018  CONCLUSIONS   Small Hyperdynamic LV cavity    1 - Normal left ventricular systolic function (EF 65-70%).     2 - Normal left ventricular diastolic function.     3 - Normal right ventricular systolic function .     4 - The estimated PA systolic pressure is 17 mmHg.     5 - No wall motion abnormalities.     EKG 8/2/2018  Vent. Rate : 095 BPM     Atrial Rate : 095 BPM     P-R Int : 134 ms          QRS Dur : 080 ms      QT Int : 330 ms       P-R-T Axes : 075 031 052 degrees     QTc Int : 414 ms    Sinus rhythm with Premature atrial complexes  Otherwise normal ECG  No previous ECGs available  Confirmed by ALTHEA SORIANO MD ([id number]) on 8/2/2018 2:28:44 PM    Anesthesia Evaluation    I have reviewed the Patient Summary Reports.    I have reviewed the Nursing Notes.   I have reviewed the Medications.     Review of Systems  Anesthesia Hx:  No problems with previous Anesthesia  History of prior surgery of interest to airway management or planning: Previous anesthesia: General, Epidural Denies Family Hx of Anesthesia complications.   Denies Personal Hx of Anesthesia complications.   Social:  Smoker    Cardiovascular:   Previous thoracic aortic aneurysm with Type B dissection s/p endovascular repair   Pulmonary:  Pulmonary Normal    Hepatic/GI:  Hepatic/GI Normal    Neurological:  Neurology Normal    Endocrine:  Endocrine  Normal        Physical Exam  General:  Well nourished    Airway/Jaw/Neck:  Airway Findings: Mouth Opening: Normal General Airway Assessment: Adult  Improves to II with phonation.  TM Distance: 4 - 6 cm      Dental:  Dental Findings: Upper Dentures   Chest/Lungs:  Chest/Lungs Findings: Clear to auscultation, Normal Respiratory Rate     Heart/Vascular:  Heart Findings: Rate: Normal  Rhythm: Regular Rhythm  Heart murmur: negative       Mental Status:  Mental Status Findings:  Cooperative, Alert and Oriented         Anesthesia Plan  Type of Anesthesia, risks & benefits discussed:  Anesthesia Type:  general, epidural  Patient's Preference:   Intra-op Monitoring Plan: standard ASA monitors and arterial line  Intra-op Monitoring Plan Comments:   Post Op Pain Control Plan: IV/PO Opioids PRN, multimodal analgesia and per primary service following discharge from PACU  Post Op Pain Control Plan Comments:   Induction:   IV  Beta Blocker:  Patient is on a Beta-Blocker and has received one dose within the past 24 hours (No further documentation required).       Informed Consent: Patient understands risks and agrees with Anesthesia plan.  Questions answered. Anesthesia consent signed with patient.  ASA Score: 2     Day of Surgery Review of History & Physical:    H&P update referred to the surgeon.         Ready For Surgery From Anesthesia Perspective.

## 2018-08-07 NOTE — PROGRESS NOTES
"LABOR NOTE    S:  Complaints: No.  Epidural working:  yes    O: /65   Pulse 87   Temp 97.7 °F (36.5 °C)   Resp 18   Ht 5' 5" (1.651 m)   Wt 63.5 kg (140 lb)   SpO2 (!) 91%   Breastfeeding? No   BMI 23.30 kg/m²       FHT: 135 Cat 1 (reassuring)  CTX: q 2-4 minutes  SVE: /-1      ASSESSMENT:   38 y.o.  at 37w3d, presents for scheduled IOL with maternal AAA, mitral valve prolapse, sickle cell trait, and GBS+ status.    FHT reassuring    Active Hospital Problems    Diagnosis  POA    *Indication for care in labor or delivery [O75.9]  Yes    Descending thoracic aortic aneurysm [I71.2]  Yes      Resolved Hospital Problems    Diagnosis Date Resolved POA   No resolved problems to display.         PLAN:    Continue Close Maternal/Fetal Monitoring  Pitocin Augmentation per protocol, currently at 8  Recheck in 2 hrs or PRN      Jacqueline Willett MD  OBGYN, PGY-1        "

## 2018-08-07 NOTE — NURSING
"Pt was asked if she felt the need to void at 1500. Pt stated, "not now, but if you give me some water, I will try." Pt was given a large cup pf ice water. When RN assessed for bleeding the peripad and incontinence pad were soaked with urine. Pt was cleaned and the bed changed. Pt voided a large amount of yellow urine. No urine was able to be measured.   "

## 2018-08-07 NOTE — PLAN OF CARE
Problem: Anesthesia/Analgesia, Neuraxial (Obstetrics)  Goal: Signs and Symptoms of Listed Potential Problems Will be Absent, Minimized or Managed (Anesthesia/Analgesia, Neuraxial)  Signs and symptoms of listed potential problems will be absent, minimized or managed by discharge/transition of care (reference Anesthesia/Analgesia, Neuraxial (Obstetrics) CPG).   Outcome: Ongoing (interventions implemented as appropriate)  Pt resting comfortably without pain.

## 2018-08-08 LAB
BASOPHILS # BLD AUTO: 0.02 K/UL
BASOPHILS NFR BLD: 0.2 %
DIFFERENTIAL METHOD: ABNORMAL
EOSINOPHIL # BLD AUTO: 0.1 K/UL
EOSINOPHIL NFR BLD: 1.2 %
ERYTHROCYTE [DISTWIDTH] IN BLOOD BY AUTOMATED COUNT: 15.3 %
HCT VFR BLD AUTO: 30.2 %
HGB BLD-MCNC: 10 G/DL
LYMPHOCYTES # BLD AUTO: 2.2 K/UL
LYMPHOCYTES NFR BLD: 18.9 %
MCH RBC QN AUTO: 26.2 PG
MCHC RBC AUTO-ENTMCNC: 33.1 G/DL
MCV RBC AUTO: 79 FL
MONOCYTES # BLD AUTO: 1 K/UL
MONOCYTES NFR BLD: 8.6 %
NEUTROPHILS # BLD AUTO: 8.2 K/UL
NEUTROPHILS NFR BLD: 70.7 %
PLATELET # BLD AUTO: 240 K/UL
PMV BLD AUTO: 10.3 FL
RBC # BLD AUTO: 3.82 M/UL
WBC # BLD AUTO: 11.63 K/UL

## 2018-08-08 PROCEDURE — 25000003 PHARM REV CODE 250: Performed by: STUDENT IN AN ORGANIZED HEALTH CARE EDUCATION/TRAINING PROGRAM

## 2018-08-08 PROCEDURE — 36415 COLL VENOUS BLD VENIPUNCTURE: CPT

## 2018-08-08 PROCEDURE — 99233 SBSQ HOSP IP/OBS HIGH 50: CPT | Mod: ,,, | Performed by: OBSTETRICS & GYNECOLOGY

## 2018-08-08 PROCEDURE — 85025 COMPLETE CBC W/AUTO DIFF WBC: CPT

## 2018-08-08 PROCEDURE — 25000003 PHARM REV CODE 250: Performed by: OBSTETRICS & GYNECOLOGY

## 2018-08-08 PROCEDURE — 11000001 HC ACUTE MED/SURG PRIVATE ROOM

## 2018-08-08 RX ADMIN — ASPIRIN 81 MG: 81 TABLET, COATED ORAL at 08:08

## 2018-08-08 RX ADMIN — ATENOLOL 12.5 MG: 25 TABLET ORAL at 08:08

## 2018-08-08 RX ADMIN — IBUPROFEN 600 MG: 600 TABLET ORAL at 06:08

## 2018-08-08 RX ADMIN — HYDROCODONE BITARTRATE AND ACETAMINOPHEN 1 TABLET: 10; 325 TABLET ORAL at 11:08

## 2018-08-08 RX ADMIN — HYDROCODONE BITARTRATE AND ACETAMINOPHEN 1 TABLET: 10; 325 TABLET ORAL at 04:08

## 2018-08-08 RX ADMIN — HYDROCODONE BITARTRATE AND ACETAMINOPHEN 1 TABLET: 5; 325 TABLET ORAL at 06:08

## 2018-08-08 RX ADMIN — IBUPROFEN 600 MG: 600 TABLET ORAL at 11:08

## 2018-08-08 NOTE — NURSING
12:45: Per sonia Mendoza to discontinue arterial line.   1300: A-line discontinued with no complications. Occlusive dressing in place.

## 2018-08-08 NOTE — NURSING TRANSFER
Nursing Transfer Note      8/8/2018       Transfer To: 606     Transfer via wheelchair    Transfer with cardiac monitoring    Transported by RN     Medicines sent: None    Chart send with patient: Yes    Notified: spouse    Patient reassessed at: 8/08/2018 (date, time)    Upon arrival to floor: patient oriented to room, call bell in reach and bed in lowest position

## 2018-08-08 NOTE — PROGRESS NOTES
OB MD at bedside discussing POC with patient- hopeful to transfer out of ICU today once he speaks with the rest of the team. I let him know the a line has been positional and with a bad waveform since 0400 even after recalibrating, flushing, tightening the connections- he says to remove a line once the rest of the team sees her. Sig other at bedside for conversation; I called the NICU so the patient could speak with the practitioner caring for her baby. Pain managed with prn medication and scheduled meds with full relief. Up to bedside commode x3. Will continue to monitor.

## 2018-08-08 NOTE — PLAN OF CARE
"Problem: Patient Care Overview  Goal: Plan of Care Review  Outcome: Ongoing (interventions implemented as appropriate)  AAOx4; VSS.Remains on oxytocin gtt at 75ml/h. Pt saturated one lochia pad this shift. Pt c/o "afterbirth pains" that were intermittent and resolved with p.o. pain medication. Pt u/o= 1100ml and one incontinent void. Pt able to eat and is now resting comfortably.       "

## 2018-08-08 NOTE — PROGRESS NOTES
MB called and notified that patient requests to see baby- arrangements to bring baby over in process.  0000: MB nurse at bedside with baby.

## 2018-08-08 NOTE — PLAN OF CARE
08/08/18 1623   Discharge Assessment   Assessment Type Discharge Planning Assessment   Confirmed/corrected address and phone number on facesheet? Yes   Assessment information obtained from? Patient   Expected Length of Stay (days) 3   Communicated expected length of stay with patient/caregiver yes   Prior to hospitilization cognitive status: Alert/Oriented   Prior to hospitalization functional status: Independent   Current cognitive status: Alert/Oriented   Current Functional Status: Independent   Facility Arrived From: [facility]   Lives With significant other;child(amber), dependent   Able to Return to Prior Arrangements yes   Is patient able to care for self after discharge? Yes   Patient currently being followed by outpatient case management? No   Patient currently receives any other outside agency services? No   Equipment Currently Used at Home none   Do you have any problems affording any of your prescribed medications? No   Does the patient have transportation home? Yes   Discharge Plan A Home with family   Patient/Family In Agreement With Plan yes       Pt will be transferred to Antepartum. Will follow.    Melissa Acuna LCSW  NICU   Ext. [phone]-phone  [email or web address]

## 2018-08-08 NOTE — PROGRESS NOTES
POSTPARTUM PROGRESS NOTE     Ailyn Stiles is a 38 y.o. female PPD #0 status post forceps assisted vaginal delivery at 37w3d in a pregnancy complicated by descending aortic anyeursm (s/p repair), anxiety, MVP, sickle cell trait. Patient is doing well this morning. She denies nausea, vomiting, fever or chills. She denies CP and SOB.  Patient reports mild abdominal pain that is well relieved by oral pain medications. Lochia is mild and stable. Patient is voiding without difficulty and ambulating with no difficulty.   Patient does not plan to breast feed. S/p PPIUD for contraception.    Objective:       Temp:  [97.5 °F (36.4 °C)-98.1 °F (36.7 °C)] 98 °F (36.7 °C)  Pulse:  [54-87] 60  Resp:  [11-29] 14  SpO2:  [90 %-100 %] 100 %  BP: (111-166)/() 121/58  Arterial Line BP: ()/() 116/94    General:   alert, appears stated age and cooperative   Lungs:   clear to auscultation bilaterally   Heart:   regular rate and rhythm, S1, S2 normal, no murmur, click, rub or gallop   Abdomen:  soft, non-tender; bowel sounds normal; no masses,  no organomegaly   Uterus:  firm located at the umblicus.    Extremities: peripheral pulses normal, no pedal edema, no clubbing or cyanosis     Lab Review  Recent Results (from the past 4 hour(s))   CBC auto differential    Collection Time: 08/08/18  3:16 AM   Result Value Ref Range    WBC 11.63 3.90 - 12.70 K/uL    RBC 3.82 (L) 4.00 - 5.40 M/uL    Hemoglobin 10.0 (L) 12.0 - 16.0 g/dL    Hematocrit 30.2 (L) 37.0 - 48.5 %    MCV 79 (L) 82 - 98 fL    MCH 26.2 (L) 27.0 - 31.0 pg    MCHC 33.1 32.0 - 36.0 g/dL    RDW 15.3 (H) 11.5 - 14.5 %    Platelets 240 150 - 350 K/uL    MPV 10.3 9.2 - 12.9 fL    Gran # (ANC) 8.2 (H) 1.8 - 7.7 K/uL    Lymph # 2.2 1.0 - 4.8 K/uL    Mono # 1.0 0.3 - 1.0 K/uL    Eos # 0.1 0.0 - 0.5 K/uL    Baso # 0.02 0.00 - 0.20 K/uL    Gran% 70.7 38.0 - 73.0 %    Lymph% 18.9 18.0 - 48.0 %    Mono% 8.6 4.0 - 15.0 %    Eosinophil% 1.2 0.0 - 8.0 %    Basophil% 0.2 0.0 -  1.9 %    Differential Method Automated        I/O    Intake/Output Summary (Last 24 hours) at 08/08/18 0610  Last data filed at 08/08/18 0300   Gross per 24 hour   Intake          1045.38 ml   Output             4750 ml   Net         -3704.62 ml        Assessment:     Patient Active Problem List   Diagnosis    Descending thoracic aortic aneurysm    36 weeks gestation of pregnancy    Dissection of thoracic aorta    Indication for care in labor or delivery    Forceps delivery with baby delivered        Plan:   1. Postpartum care:  - Patient doing well. Continue routine management and advances.  - Continue PO pain meds. Pain well controlled.  - Heme: H/h 8/27>10/30  - Encourage ambulation  - Contraception - s/p PP IUD  - Lactation - PRN lactation consult    2. S/p aortic aneurysm repair  - diuresing well (4.7L overnight, net -3700 cc)  - remains asymptomatic, denies CP, SOB  - Consider step-down from ICU today      Johann Hilliard MD   PGY-4 Ob-gyn

## 2018-08-08 NOTE — NURSING
Notified Dr. Hilliard that arterial line has no longer accurate and has been reported to be extremely positional overnight as well.  RN ordered to not discontinue line at this time until further notice.

## 2018-08-08 NOTE — PLAN OF CARE
Pt's  admitted to the NICU. Pt and  will be followed by the NICU .  Please see note below.    Copied from infant's chart  _______________________________________    SOCIAL WORK DISCHARGE PLANNING ASSESSMENT     Sw completed discharge planning assessment with pt's mother in mother's room BICU 06.  Pt's mother was guarded, slow to warm-up, not receptive to sw visit and defensive. Education on the role of  was provided. Emotional support provided throughout assessment.        Legal Name: Gina Stacy                 :  2017         Patient Active Problem List   Diagnosis    Single liveborn, born in hospital, delivered by vaginal delivery    Need for observation and evaluation of  for sepsis     affected by maternal use of drug of addiction     suspected to be affected by maternal use of tobacco    Temperature instability in             Birth Hospital:[facility]           ELLE: 2017               Birth Weight:   2.56 kg (5 lb 10.3 oz)              Birth Length: 45.1cm               Gestational Age: 37w3d           [place] Assessment    Living status:  Living  Apgars:      1 Minute:   5 Minute:   10 Minute:   15 Minute:   20 Minute:     Skin Color:   1  1          Heart Rate:   2  2          Reflex Irritability:   2  2          Muscle Tone:   2  2          Respiratory Effort:   2  2          Total:   9  9                        Apgars Assigned By:  KATHRYN            Mother: Ailyn Stiles, age 37,  1987  Address: [facility]  [address] [address]  Phone: [phone]  Employer: [employer]                        Job Title:   Education: high school diploma        Father: Carl Carrera, age 35,  1983  Address: same as above  Phone: [phone]  Employer: [employer]  Job Title:   Education:  high school diploma  Signed Birth Certificate: Yes; parents are involved in a  relationship and reside together.      Support person(s): Aidan Grossman ([facility]) [phone]     Sibling(s): Ada Anders-17yo and Cleve Bolden-11yo; maternal siblings     Spiritual Affiliation: Yes  Mandaen     [facility] Net[employer] Plan (formerly [place] Medicaid): Primary: Yes Secondary: No   [facility]      Pediatrician: Dr. Walker in [place], [place]       Nutrition: Formula               Breast Pump:                             WIC:              Mom already certified; will also apply for         Essential Items: (includes car seat, crib/bassinet/pack-n-play, clothing, bottles, diapers, etc.)  Acquired      Transportation: Personal vehicle and Family/friends      Education: Information given on CPR classes and Physician/NNP daily rounds. Mom already certified.     Resources Given: [facility] [employer], [facility], Medicaid transportation, Immunizations, Glossary of Commonly Used Terms, SSI Benefits, Preparing for Your Baby's Discharge Home, Support Resources for NICU Families, Insurance Coverage of Breast Pumps and Supplies, Breast Pumps through [facility], [place], [employer], and [facility] [place].        Potential Eligibility for SSI Benefits: No     Potential Discharge Needs:  None      Sw informed mom of the possibility of lodging assistance through [place] medicaid. Encouraged mom to call and make a request. Sw provided mom with the appropriate contact number. Will follow.     Melissa Acuna [facility]  NICU   [phone]  [email or web address]

## 2018-08-09 PROCEDURE — 25000003 PHARM REV CODE 250: Performed by: STUDENT IN AN ORGANIZED HEALTH CARE EDUCATION/TRAINING PROGRAM

## 2018-08-09 PROCEDURE — 11000001 HC ACUTE MED/SURG PRIVATE ROOM

## 2018-08-09 PROCEDURE — 25000003 PHARM REV CODE 250: Performed by: OBSTETRICS & GYNECOLOGY

## 2018-08-09 PROCEDURE — 99233 SBSQ HOSP IP/OBS HIGH 50: CPT | Mod: ,,, | Performed by: OBSTETRICS & GYNECOLOGY

## 2018-08-09 RX ADMIN — HYDROCODONE BITARTRATE AND ACETAMINOPHEN 1 TABLET: 5; 325 TABLET ORAL at 06:08

## 2018-08-09 RX ADMIN — IBUPROFEN 600 MG: 600 TABLET ORAL at 07:08

## 2018-08-09 RX ADMIN — HYDROCODONE BITARTRATE AND ACETAMINOPHEN 1 TABLET: 5; 325 TABLET ORAL at 12:08

## 2018-08-09 RX ADMIN — ATENOLOL 12.5 MG: 25 TABLET ORAL at 08:08

## 2018-08-09 RX ADMIN — IBUPROFEN 600 MG: 600 TABLET ORAL at 06:08

## 2018-08-09 RX ADMIN — IBUPROFEN 600 MG: 600 TABLET ORAL at 11:08

## 2018-08-09 RX ADMIN — IBUPROFEN 600 MG: 600 TABLET ORAL at 12:08

## 2018-08-09 RX ADMIN — HYDROCODONE BITARTRATE AND ACETAMINOPHEN 1 TABLET: 10; 325 TABLET ORAL at 07:08

## 2018-08-09 RX ADMIN — HYDROCODONE BITARTRATE AND ACETAMINOPHEN 1 TABLET: 10; 325 TABLET ORAL at 01:08

## 2018-08-09 RX ADMIN — ASPIRIN 81 MG: 81 TABLET, COATED ORAL at 08:08

## 2018-08-09 NOTE — PROGRESS NOTES
POSTPARTUM PROGRESS NOTE     Ailyn Stiles is a 38 y.o. female PPD #2 status post outlet forceps vaginal delivery at 37w3d in a pregnancy complicated by maternal thoracic aortic aneurysm, mitral valve prolapse, sickle cell trait, and GBS+ status. Patient is doing well this morning. She denies nausea, vomiting, fever or chills.  Patient reports moderate abdominal pain that is adequately relieved by oral pain medications. Lochia is mild to moderate  and stable. Patient is voiding without difficulty and ambulating with no difficulty. She has passed flatus, and has not had BM.  Patient does plan to breast feed. Postpartum IUD for contraception.    Objective:       Temp:  [97.7 °F (36.5 °C)-98.7 °F (37.1 °C)] 98.3 °F (36.8 °C)  Pulse:  [54-99] 68  Resp:  [13-30] 18  SpO2:  [97 %-100 %] 99 %  BP: (116-154)/(61-80) 119/73  Arterial Line BP: (134)/(66) 134/66    General:   alert, appears stated age and cooperative   Lungs:   clear to auscultation bilaterally   Heart:   regular rate and rhythm, S1, S2 normal, no murmur, click, rub or gallop   Abdomen:  soft, non-tender; bowel sounds normal; no masses,  no organomegaly   Uterus:  firm located at the umblicus.            Extremities: peripheral pulses normal, no pedal edema, no clubbing or cyanosis     Lab Review  No results found for this or any previous visit (from the past 4 hour(s)).    I/O    Intake/Output Summary (Last 24 hours) at 08/09/18 0724  Last data filed at 08/08/18 1200   Gross per 24 hour   Intake              480 ml   Output             1200 ml   Net             -720 ml        Assessment:     Patient Active Problem List   Diagnosis    Descending thoracic aortic aneurysm    36 weeks gestation of pregnancy    Dissection of thoracic aorta    Indication for care in labor or delivery    Forceps delivery with baby delivered        Plan:   1. Postpartum care:  - Patient doing well. Continue routine management and advances.  - Continue PO pain meds. Pain well  controlled.  - Heme: H/h 9/27 > 10/30  - Encourage ambulation  - Contraception: ppIUD  - Lactation prn    2. Thoracic aortic aneurysm  - Asymptomatic  - Continue atenolol 12.5 mg PO daily  - Monitor BP closely  - Diuresing well, net 720 cc yesterday      Dispo: As patient meets milestones, will plan to discharge PPD #3 due to need for monitoring of her aortic aneurysm.    Nayla Mcgregor MD  OBGYN PGY-1

## 2018-08-09 NOTE — DISCHARGE SUMMARY
Delivery Discharge Summary  Obstetrics      Primary OB Clinician: Outside OB    Admission date: 2018  Discharge date: 08/10/2018    Disposition: To home, self care    Admit Dx:      Patient Active Problem List   Diagnosis    Descending thoracic aortic aneurysm    36 weeks gestation of pregnancy    Dissection of thoracic aorta    Indication for care in labor or delivery    Forceps delivery with baby delivered     Discharge Dx:    Patient Active Problem List   Diagnosis    Descending thoracic aortic aneurysm    36 weeks gestation of pregnancy    Dissection of thoracic aorta    Indication for care in labor or delivery    Forceps delivery with baby delivered       Procedure: Forceps delivery    Hospital Course:  Ailyn Stiles is a 38 y.o. now , PPD #3 who was admitted on 2018 at 37w3d for IOL for maternal thoracic aortic aneurysm. On initial assessment, vital signs were stable and physical exam was normal. Infant was in cephalic presentation. Patient was subsequently admitted to labor and delivery unit with signed consents.  Patient delivered a single viable  female via outlet forceps delivery. Please see delivery note for further details. Pt was in stable condition post delivery and was transferred to the ICU for close monitoring of her . Her postpartum course was uncomplicated. On discharge day, patient's pain is controlled with oral pain medications. Pt is tolerating ambulation without SOB or CP, and PO diet without N/V. Reports lochia is mild. Denies any HA, vision changes, F/C, LE swelling. Pt in stable condition and ready for discharge. Discharge instructions and precautions reviewed.    Pertinent studies:  Postpartum CBC  Lab Results   Component Value Date    WBC 11.63 2018    HGB 10.0 (L) 2018    HCT 30.2 (L) 2018    MCV 79 (L) 2018     2018       Tubal Ligation: n/a  Feeding Method: breast  Rh Immune Globulin Given(O POS): N/A  Rubella Vaccine  Given: N/A  Tdap Vaccine Given: Ordered. To be given prior to discharge.    Delivery:    Episiotomy: None   Lacerations:     Repair suture:     Repair # of packets:     Blood loss (ml): 150     Birth information:  YOB: 2017   Time of birth: 11:14 AM   Sex: female   Delivery type: Vaginal, Forceps   Gestational Age: 37w3d    Delivery Clinician:      Other providers:       Additional  information:  Forceps:    Vacuum:    Breech:    Observed anomalies      Living?:           APGARS  One minute Five minutes Ten minutes   Skin color:         Heart rate:         Grimace:         Muscle tone:         Breathing:         Totals: 9  9        Placenta: Delivered:       appearance      Patient Instructions:   Current Discharge Medication List      CONTINUE these medications which have NOT CHANGED    Details   aspirin 81 MG Chew Take 81 mg by mouth once daily.      ATENOLOL ORAL Take 10 mg by mouth once daily.       ergocalciferol, vitamin D2, (VITAMIN D ORAL) Take 5,000 mg by mouth once daily.      PRENATAL NO.52-IRON-FA-DHA ORAL Take 1 tablet by mouth once daily.               Discharge Procedure Orders  Diet Adult Regular     Other restrictions (specify):   Order Comments: Pelvic rest for at least 6 weeks. Nothing in vagina - no sex, tampons, or douching for 6 weeks     Notify your health care provider if you experience any of the following:   Order Comments: Heavy vaginal bleeding saturating more than 1 pad per hour for at least 2 hours.     Notify your health care provider if you experience any of the following:  increased confusion or weakness     Notify your health care provider if you experience any of the following:  persistent dizziness, light-headedness, or visual disturbances     Notify your health care provider if you experience any of the following:  severe persistent headache     Notify your health care provider if you experience any of the following:  difficulty breathing or increased cough      Notify your health care provider if you experience any of the following:  severe uncontrolled pain     Notify your health care provider if you experience any of the following:  persistent nausea and vomiting or diarrhea     Notify your health care provider if you experience any of the following:  temperature >100.4     Activity as tolerated         Nayla Mcgregor MD  OBGYN PGY-1

## 2018-08-10 VITALS
DIASTOLIC BLOOD PRESSURE: 78 MMHG | HEART RATE: 71 BPM | SYSTOLIC BLOOD PRESSURE: 148 MMHG | TEMPERATURE: 98 F | HEIGHT: 65 IN | RESPIRATION RATE: 18 BRPM | WEIGHT: 145.5 LBS | OXYGEN SATURATION: 100 % | BODY MASS INDEX: 24.24 KG/M2

## 2018-08-10 LAB
BLD PROD TYP BPU: NORMAL
BLOOD UNIT EXPIRATION DATE: NORMAL
BLOOD UNIT TYPE CODE: 5100
BLOOD UNIT TYPE: NORMAL
CODING SYSTEM: NORMAL
DISPENSE STATUS: NORMAL
NUM UNITS TRANS PACKED RBC: NORMAL
TRANS ERYTHROCYTES VOL PATIENT: NORMAL ML

## 2018-08-10 PROCEDURE — 25000003 PHARM REV CODE 250: Performed by: OBSTETRICS & GYNECOLOGY

## 2018-08-10 PROCEDURE — 25000003 PHARM REV CODE 250: Performed by: PEDIATRICS

## 2018-08-10 PROCEDURE — 99231 SBSQ HOSP IP/OBS SF/LOW 25: CPT | Mod: ,,, | Performed by: OBSTETRICS & GYNECOLOGY

## 2018-08-10 PROCEDURE — 25000003 PHARM REV CODE 250: Performed by: STUDENT IN AN ORGANIZED HEALTH CARE EDUCATION/TRAINING PROGRAM

## 2018-08-10 RX ORDER — ATENOLOL 25 MG/1
25 TABLET ORAL DAILY
Qty: 30 TABLET | Refills: 11 | Status: SHIPPED | OUTPATIENT
Start: 2018-08-11 | End: 2019-08-11

## 2018-08-10 RX ORDER — ATENOLOL 25 MG/1
25 TABLET ORAL DAILY
Qty: 30 TABLET | Refills: 11 | Status: SHIPPED | OUTPATIENT
Start: 2018-08-11 | End: 2018-08-10

## 2018-08-10 RX ORDER — HYDROCODONE BITARTRATE AND ACETAMINOPHEN 5; 325 MG/1; MG/1
1 TABLET ORAL EVERY 4 HOURS PRN
Qty: 10 TABLET | Refills: 0 | Status: SHIPPED | OUTPATIENT
Start: 2018-08-10

## 2018-08-10 RX ORDER — IBUPROFEN 600 MG/1
600 TABLET ORAL EVERY 6 HOURS
Qty: 120 TABLET | Refills: 0 | Status: SHIPPED | OUTPATIENT
Start: 2018-08-10

## 2018-08-10 RX ADMIN — ATENOLOL 12.5 MG: 25 TABLET ORAL at 10:08

## 2018-08-10 RX ADMIN — HYDROCODONE BITARTRATE AND ACETAMINOPHEN 1 TABLET: 5; 325 TABLET ORAL at 12:08

## 2018-08-10 RX ADMIN — IBUPROFEN 600 MG: 600 TABLET ORAL at 12:08

## 2018-08-10 RX ADMIN — ATENOLOL 12.5 MG: 25 TABLET ORAL at 04:08

## 2018-08-10 RX ADMIN — ASPIRIN 81 MG: 81 TABLET, COATED ORAL at 10:08

## 2018-08-10 RX ADMIN — IBUPROFEN 600 MG: 600 TABLET ORAL at 11:08

## 2018-08-10 NOTE — PLAN OF CARE
Problem: Patient Care Overview  Goal: Plan of Care Review  Outcome: Outcome(s) achieved Date Met: 08/10/18  Pt ambulating, voiding, and passing flatus. Pt tolerating PO well and there is no SS of distress at this time. Pt's pain well controlled throughout shift by oral pain medication. Pt's bleeding has been light throughout shift and fundus is firm.  Pt discharged this afternoon per physician's order. How to Care for Yourself After Delivery booklet reviewed yesterday. Pt and spouse verbalized understanding. Pt and spouse also verbalized understanding that the pt must follow up on Monday or Tuesday of next week. Pt also verbalized understanding that she must check her blood pressure frequently (every few hours) for the next 24-48 hrs. Pt has blood pressure machine at home. Pt also verbalized understanding that she needs to contact her physician or come in to the emergency room if she has blood pressures outside of the range of (systolic 120-140) and (diastolic 70-90). Pt stable at this time. Okay to be discharged home now.

## 2018-08-10 NOTE — PROGRESS NOTES
POSTPARTUM PROGRESS NOTE     Ailyn Stiles is a 38 y.o. female PPD #3 status post outlet forceps vaginal delivery at 37w3d in a pregnancy complicated by maternal thoracic aortic aneurysm, mitral valve prolapse, sickle cell trait, and GBS+ status. Patient is doing well this morning. She denies nausea, vomiting, fever or chills. Patient asking to go home today, baby to be discharged home today.  Patient reports moderate abdominal pain that is adequately relieved by oral pain medications. Lochia is mild to moderate  and stable. Patient is voiding without difficulty and ambulating with no difficulty. She has passed flatus, and has not had BM.  Patient does plan to breast feed. Postpartum IUD for contraception.    Objective:       Temp:  [97.8 °F (36.6 °C)-98.6 °F (37 °C)] 97.8 °F (36.6 °C)  Pulse:  [63-74] 67  Resp:  [18] 18  SpO2:  [100 %] 100 %  BP: (127-156)/(70-88) 127/88    General:   alert, appears stated age and cooperative   Lungs:   clear to auscultation bilaterally   Heart:   regular rate and rhythm, S1, S2 normal, no murmur, click, rub or gallop   Abdomen:  soft, non-tender; bowel sounds normal; no masses,  no organomegaly   Uterus:  firm located at the umblicus.            Extremities: peripheral pulses normal, no pedal edema, no clubbing or cyanosis     Lab Review  No results found for this or any previous visit (from the past 4 hour(s)).    I/O  No intake or output data in the 24 hours ending 08/10/18 0631     Assessment:     Patient Active Problem List   Diagnosis    Descending thoracic aortic aneurysm    36 weeks gestation of pregnancy    Dissection of thoracic aorta    Indication for care in labor or delivery    Forceps delivery with baby delivered        Plan:   1. Postpartum care:  - Patient doing well. Continue routine management and advances.  - Continue PO pain meds. Pain well controlled.  - Heme: H/h 9/27 > 10/30  - Encourage ambulation  - Contraception: ppIUD  - Lactation prn    2. Thoracic  aortic aneurysm  - BP: (127-156)/(70-88) 127/88  - Asymptomatic  - Continue atenolol 12.5 mg PO daily        Dispo: As patient meets milestones, will plan to discharge PPD #3.    Nayla Mcgregor MD  OBGYN PGY-1

## 2018-08-10 NOTE — ANESTHESIA POSTPROCEDURE EVALUATION
"Anesthesia Post Evaluation    Patient: Ailyn Stiles    Procedure(s) Performed: * No procedures listed *    Final Anesthesia Type: epidural  Patient location during evaluation: floor  Patient participation: Yes- Able to Participate  Level of consciousness: awake and alert and oriented  Post-procedure vital signs: reviewed and stable  Pain management: adequate  Airway patency: patent  PONV status at discharge: No PONV  Anesthetic complications: no      Cardiovascular status: hemodynamically stable, blood pressure returned to baseline and stable  Respiratory status: unassisted, spontaneous ventilation and room air  Hydration status: euvolemic  Follow-up not needed.        Visit Vitals  /76   Pulse 67   Temp 37 °C (98.6 °F) (Oral)   Resp 18   Ht 5' 5" (1.651 m)   Wt 66 kg (145 lb 8.1 oz)   SpO2 99%   Breastfeeding? Unknown   BMI 24.21 kg/m²       Pain/Kathryn Score: Pain Rating Prior to Med Admin: 4 (8/10/2018 11:19 AM)  Pain Rating Post Med Admin: 3 (8/10/2018  1:15 AM)      "

## 2018-08-13 LAB
DIASTOLIC DYSFUNCTION: NO
RETIRED EF AND QEF - SEE NOTES: 70 (ref 55–65)
TRICUSPID VALVE REGURGITATION: NORMAL

## 2018-09-05 ENCOUNTER — TELEPHONE (OUTPATIENT)
Dept: CARDIOLOGY | Facility: CLINIC | Age: 38
End: 2018-09-05

## 2018-09-05 NOTE — TELEPHONE ENCOUNTER
----- Message from Timi Honeycutt MD sent at 9/5/2018 10:12 AM CDT -----  Contact: Self [phone]  She definitely should, but she doesn't need to see me.  She should see the doctor that put the stents in.  ----- Message -----  From: Jasper Cavazos RN  Sent: 9/4/2018   3:07 PM  To: Timi Honeycutt MD    Ever heard back from vascular?  ----- Message -----  From: Tabitha Acuna  Sent: 9/4/2018   2:19 PM  To: Yinka VALDEZ Staff    Needs Advice    Reason for call:      Communication Preference: Self [phone]  Additional Information: Pt wanted to know if she needs to schedule an appt to check on her aneurysm. She would like to speak with dr or nurse when possible.

## 2019-07-15 PROBLEM — Z3A.36 36 WEEKS GESTATION OF PREGNANCY: Status: RESOLVED | Noted: 2018-08-03 | Resolved: 2019-07-15

## 2019-10-16 ENCOUNTER — HISTORICAL (OUTPATIENT)
Dept: RADIOLOGY | Facility: HOSPITAL | Age: 39
End: 2019-10-16

## 2019-10-16 LAB
BUN SERPL-MCNC: 12 MG/DL (ref 7–18)
CALCIUM SERPL-MCNC: 8.7 MG/DL (ref 8.5–10.1)
CHLORIDE SERPL-SCNC: 105 MMOL/L (ref 98–107)
CO2 SERPL-SCNC: 29 MMOL/L (ref 21–32)
CREAT SERPL-MCNC: 0.7 MG/DL (ref 0.55–1.02)
CREAT/UREA NIT SERPL: 17.1
GLUCOSE SERPL-MCNC: 82 MG/DL (ref 74–106)
POC CREATININE: 0.7 MG/DL (ref 0.6–1.3)
POTASSIUM SERPL-SCNC: 4.2 MMOL/L (ref 3.5–5.1)
SODIUM SERPL-SCNC: 139 MMOL/L (ref 136–145)

## 2020-07-07 ENCOUNTER — HISTORICAL (OUTPATIENT)
Dept: ADMINISTRATIVE | Facility: HOSPITAL | Age: 40
End: 2020-07-07

## 2022-04-10 ENCOUNTER — HISTORICAL (OUTPATIENT)
Dept: ADMINISTRATIVE | Facility: HOSPITAL | Age: 42
End: 2022-04-10
Payer: MEDICAID

## 2022-04-30 VITALS — DIASTOLIC BLOOD PRESSURE: 94 MMHG | SYSTOLIC BLOOD PRESSURE: 149 MMHG
